# Patient Record
Sex: MALE | Race: WHITE | NOT HISPANIC OR LATINO | Employment: OTHER | URBAN - METROPOLITAN AREA
[De-identification: names, ages, dates, MRNs, and addresses within clinical notes are randomized per-mention and may not be internally consistent; named-entity substitution may affect disease eponyms.]

---

## 2019-04-18 ENCOUNTER — HOSPITAL ENCOUNTER (INPATIENT)
Facility: HOSPITAL | Age: 69
LOS: 8 days | Discharge: NURSING, REHAB OR LONG-TERM CARE | End: 2019-04-26
Attending: EMERGENCY MEDICINE | Admitting: HOSPITALIST

## 2019-04-18 ENCOUNTER — APPOINTMENT (OUTPATIENT)
Dept: CT IMAGING | Facility: HOSPITAL | Age: 69
End: 2019-04-18

## 2019-04-18 DIAGNOSIS — N17.9 AKI (ACUTE KIDNEY INJURY) (HCC): Primary | ICD-10-CM

## 2019-04-18 DIAGNOSIS — R26.89 DECREASED MOBILITY: ICD-10-CM

## 2019-04-18 PROBLEM — I69.320 COMBINED RECEPTIVE AND EXPRESSIVE APHASIA DUE TO OLD STROKE: Status: ACTIVE | Noted: 2019-04-18

## 2019-04-18 PROBLEM — I10 HYPERTENSION: Status: ACTIVE | Noted: 2019-04-18

## 2019-04-18 PROBLEM — I63.9 STROKE (HCC): Status: ACTIVE | Noted: 2019-04-18

## 2019-04-18 PROBLEM — I50.22 CHRONIC SYSTOLIC CHF (CONGESTIVE HEART FAILURE): Status: ACTIVE | Noted: 2019-04-18

## 2019-04-18 PROBLEM — E11.29 TYPE 2 DIABETES MELLITUS WITH RENAL COMPLICATION: Status: ACTIVE | Noted: 2019-04-18

## 2019-04-18 PROBLEM — G81.90 HEMIPARESIS: Status: ACTIVE | Noted: 2019-04-18

## 2019-04-18 PROBLEM — I48.91 ATRIAL FIBRILLATION: Status: ACTIVE | Noted: 2019-04-18

## 2019-04-18 LAB
ALBUMIN SERPL-MCNC: 3.9 G/DL (ref 3.5–5.2)
ALBUMIN/GLOB SERPL: 1 G/DL
ALP SERPL-CCNC: 51 U/L (ref 39–117)
ALT SERPL W P-5'-P-CCNC: 30 U/L (ref 1–41)
ANION GAP SERPL CALCULATED.3IONS-SCNC: 12.3 MMOL/L
AST SERPL-CCNC: 20 U/L (ref 1–40)
BASOPHILS # BLD AUTO: 0.05 10*3/MM3 (ref 0–0.2)
BASOPHILS NFR BLD AUTO: 0.7 % (ref 0–1.5)
BILIRUB SERPL-MCNC: 1.2 MG/DL (ref 0.2–1.2)
BUN BLD-MCNC: 67 MG/DL (ref 8–23)
BUN/CREAT SERPL: 25.6 (ref 7–25)
CALCIUM SPEC-SCNC: 10.4 MG/DL (ref 8.6–10.5)
CHLORIDE SERPL-SCNC: 101 MMOL/L (ref 98–107)
CO2 SERPL-SCNC: 21.7 MMOL/L (ref 22–29)
CREAT BLD-MCNC: 2.62 MG/DL (ref 0.76–1.27)
DEPRECATED RDW RBC AUTO: 45.4 FL (ref 37–54)
DIGOXIN SERPL-MCNC: 1.3 NG/ML (ref 0.6–1.2)
EOSINOPHIL # BLD AUTO: 0.24 10*3/MM3 (ref 0–0.4)
EOSINOPHIL NFR BLD AUTO: 3.3 % (ref 0.3–6.2)
ERYTHROCYTE [DISTWIDTH] IN BLOOD BY AUTOMATED COUNT: 13.3 % (ref 12.3–15.4)
GFR SERPL CREATININE-BSD FRML MDRD: 25 ML/MIN/1.73
GLOBULIN UR ELPH-MCNC: 4 GM/DL
GLUCOSE BLD-MCNC: 159 MG/DL (ref 65–99)
GLUCOSE BLDC GLUCOMTR-MCNC: 107 MG/DL (ref 70–130)
GLUCOSE BLDC GLUCOMTR-MCNC: 124 MG/DL (ref 70–130)
HBA1C MFR BLD: 8.7 % (ref 4.8–5.6)
HCT VFR BLD AUTO: 39.2 % (ref 37.5–51)
HGB BLD-MCNC: 12.2 G/DL (ref 13–17.7)
IMM GRANULOCYTES # BLD AUTO: 0.05 10*3/MM3 (ref 0–0.05)
IMM GRANULOCYTES NFR BLD AUTO: 0.7 % (ref 0–0.5)
INR PPP: 1.23 (ref 0.9–1.1)
LYMPHOCYTES # BLD AUTO: 0.65 10*3/MM3 (ref 0.7–3.1)
LYMPHOCYTES NFR BLD AUTO: 8.9 % (ref 19.6–45.3)
MAGNESIUM SERPL-MCNC: 1.9 MG/DL (ref 1.6–2.4)
MCH RBC QN AUTO: 29.2 PG (ref 26.6–33)
MCHC RBC AUTO-ENTMCNC: 31.1 G/DL (ref 31.5–35.7)
MCV RBC AUTO: 93.8 FL (ref 79–97)
MONOCYTES # BLD AUTO: 0.54 10*3/MM3 (ref 0.1–0.9)
MONOCYTES NFR BLD AUTO: 7.4 % (ref 5–12)
NEUTROPHILS # BLD AUTO: 5.8 10*3/MM3 (ref 1.4–7)
NEUTROPHILS NFR BLD AUTO: 79 % (ref 42.7–76)
NRBC BLD AUTO-RTO: 0 /100 WBC (ref 0–0)
PHOSPHATE SERPL-MCNC: 4.1 MG/DL (ref 2.5–4.5)
PLATELET # BLD AUTO: 134 10*3/MM3 (ref 140–450)
PMV BLD AUTO: 12.6 FL (ref 6–12)
POTASSIUM BLD-SCNC: 5.3 MMOL/L (ref 3.5–5.2)
PROT SERPL-MCNC: 7.9 G/DL (ref 6–8.5)
PROTHROMBIN TIME: 15.2 SECONDS (ref 11.7–14.2)
RBC # BLD AUTO: 4.18 10*6/MM3 (ref 4.14–5.8)
SODIUM BLD-SCNC: 135 MMOL/L (ref 136–145)
WBC NRBC COR # BLD: 7.33 10*3/MM3 (ref 3.4–10.8)

## 2019-04-18 PROCEDURE — 93010 ELECTROCARDIOGRAM REPORT: CPT | Performed by: INTERNAL MEDICINE

## 2019-04-18 PROCEDURE — 99284 EMERGENCY DEPT VISIT MOD MDM: CPT

## 2019-04-18 PROCEDURE — 85025 COMPLETE CBC W/AUTO DIFF WBC: CPT | Performed by: EMERGENCY MEDICINE

## 2019-04-18 PROCEDURE — 36415 COLL VENOUS BLD VENIPUNCTURE: CPT | Performed by: NURSE PRACTITIONER

## 2019-04-18 PROCEDURE — 83735 ASSAY OF MAGNESIUM: CPT | Performed by: NURSE PRACTITIONER

## 2019-04-18 PROCEDURE — 85610 PROTHROMBIN TIME: CPT | Performed by: NURSE PRACTITIONER

## 2019-04-18 PROCEDURE — 80053 COMPREHEN METABOLIC PANEL: CPT | Performed by: EMERGENCY MEDICINE

## 2019-04-18 PROCEDURE — 70450 CT HEAD/BRAIN W/O DYE: CPT

## 2019-04-18 PROCEDURE — 82962 GLUCOSE BLOOD TEST: CPT

## 2019-04-18 PROCEDURE — 84100 ASSAY OF PHOSPHORUS: CPT | Performed by: NURSE PRACTITIONER

## 2019-04-18 PROCEDURE — 51798 US URINE CAPACITY MEASURE: CPT

## 2019-04-18 PROCEDURE — 83036 HEMOGLOBIN GLYCOSYLATED A1C: CPT | Performed by: NURSE PRACTITIONER

## 2019-04-18 PROCEDURE — 93005 ELECTROCARDIOGRAM TRACING: CPT | Performed by: NURSE PRACTITIONER

## 2019-04-18 PROCEDURE — 25010000002 ENOXAPARIN PER 10 MG: Performed by: NURSE PRACTITIONER

## 2019-04-18 PROCEDURE — 80162 ASSAY OF DIGOXIN TOTAL: CPT | Performed by: NURSE PRACTITIONER

## 2019-04-18 RX ORDER — CARVEDILOL 12.5 MG/1
12.5 TABLET ORAL 2 TIMES DAILY WITH MEALS
Status: DISCONTINUED | OUTPATIENT
Start: 2019-04-18 | End: 2019-04-20

## 2019-04-18 RX ORDER — ATORVASTATIN CALCIUM 80 MG/1
80 TABLET, FILM COATED ORAL DAILY
Status: DISCONTINUED | OUTPATIENT
Start: 2019-04-18 | End: 2019-04-18

## 2019-04-18 RX ORDER — LOSARTAN POTASSIUM 25 MG/1
25 TABLET ORAL DAILY
COMMUNITY
End: 2019-04-26 | Stop reason: HOSPADM

## 2019-04-18 RX ORDER — ONDANSETRON 4 MG/1
4 TABLET, FILM COATED ORAL EVERY 6 HOURS PRN
Status: DISCONTINUED | OUTPATIENT
Start: 2019-04-18 | End: 2019-04-26 | Stop reason: HOSPADM

## 2019-04-18 RX ORDER — NICOTINE POLACRILEX 4 MG
15 LOZENGE BUCCAL
Status: DISCONTINUED | OUTPATIENT
Start: 2019-04-18 | End: 2019-04-26 | Stop reason: HOSPADM

## 2019-04-18 RX ORDER — SODIUM CHLORIDE 9 MG/ML
50 INJECTION, SOLUTION INTRAVENOUS CONTINUOUS
Status: DISCONTINUED | OUTPATIENT
Start: 2019-04-18 | End: 2019-04-20

## 2019-04-18 RX ORDER — CLOPIDOGREL BISULFATE 75 MG/1
75 TABLET ORAL DAILY
Status: DISCONTINUED | OUTPATIENT
Start: 2019-04-18 | End: 2019-04-21

## 2019-04-18 RX ORDER — DIGOXIN 125 MCG
125 TABLET ORAL
COMMUNITY
End: 2019-04-26 | Stop reason: HOSPADM

## 2019-04-18 RX ORDER — NICOTINE POLACRILEX 4 MG
15 LOZENGE BUCCAL
COMMUNITY

## 2019-04-18 RX ORDER — ATORVASTATIN CALCIUM 80 MG/1
80 TABLET, FILM COATED ORAL DAILY
COMMUNITY
End: 2019-04-26 | Stop reason: HOSPADM

## 2019-04-18 RX ORDER — ACETAMINOPHEN 325 MG/1
650 TABLET ORAL EVERY 4 HOURS PRN
COMMUNITY

## 2019-04-18 RX ORDER — SODIUM CHLORIDE 0.9 % (FLUSH) 0.9 %
3-10 SYRINGE (ML) INJECTION AS NEEDED
Status: DISCONTINUED | OUTPATIENT
Start: 2019-04-18 | End: 2019-04-26 | Stop reason: HOSPADM

## 2019-04-18 RX ORDER — CLOPIDOGREL BISULFATE 75 MG/1
75 TABLET ORAL DAILY
COMMUNITY
End: 2019-04-26 | Stop reason: HOSPADM

## 2019-04-18 RX ORDER — POLYETHYLENE GLYCOL 3350 17 G/17G
17 POWDER, FOR SOLUTION ORAL DAILY PRN
COMMUNITY

## 2019-04-18 RX ORDER — SODIUM CHLORIDE 0.9 % (FLUSH) 0.9 %
10 SYRINGE (ML) INJECTION AS NEEDED
Status: DISCONTINUED | OUTPATIENT
Start: 2019-04-18 | End: 2019-04-26 | Stop reason: HOSPADM

## 2019-04-18 RX ORDER — CARVEDILOL 12.5 MG/1
12.5 TABLET ORAL 2 TIMES DAILY WITH MEALS
COMMUNITY
End: 2019-04-26 | Stop reason: HOSPADM

## 2019-04-18 RX ORDER — DEXTROSE MONOHYDRATE 25 G/50ML
25 INJECTION, SOLUTION INTRAVENOUS
Status: DISCONTINUED | OUTPATIENT
Start: 2019-04-18 | End: 2019-04-26 | Stop reason: HOSPADM

## 2019-04-18 RX ORDER — SODIUM CHLORIDE 0.9 % (FLUSH) 0.9 %
3 SYRINGE (ML) INJECTION EVERY 12 HOURS SCHEDULED
Status: DISCONTINUED | OUTPATIENT
Start: 2019-04-18 | End: 2019-04-26 | Stop reason: HOSPADM

## 2019-04-18 RX ORDER — ATORVASTATIN CALCIUM 80 MG/1
80 TABLET, FILM COATED ORAL NIGHTLY
Status: DISCONTINUED | OUTPATIENT
Start: 2019-04-18 | End: 2019-04-20

## 2019-04-18 RX ORDER — ALLOPURINOL 300 MG/1
300 TABLET ORAL DAILY
COMMUNITY

## 2019-04-18 RX ADMIN — CLOPIDOGREL 75 MG: 75 TABLET, FILM COATED ORAL at 17:45

## 2019-04-18 RX ADMIN — CARVEDILOL 12.5 MG: 12.5 TABLET, FILM COATED ORAL at 17:45

## 2019-04-18 RX ADMIN — ENOXAPARIN SODIUM 30 MG: 30 INJECTION SUBCUTANEOUS at 17:45

## 2019-04-18 RX ADMIN — SODIUM CHLORIDE 100 ML/HR: 9 INJECTION, SOLUTION INTRAVENOUS at 17:46

## 2019-04-18 RX ADMIN — SODIUM CHLORIDE 1000 ML: 9 INJECTION, SOLUTION INTRAVENOUS at 12:50

## 2019-04-19 LAB
ANION GAP SERPL CALCULATED.3IONS-SCNC: 13.7 MMOL/L
BACTERIA UR QL AUTO: NORMAL /HPF
BILIRUB UR QL STRIP: NEGATIVE
BUN BLD-MCNC: 55 MG/DL (ref 8–23)
BUN/CREAT SERPL: 28.5 (ref 7–25)
CALCIUM SPEC-SCNC: 8.8 MG/DL (ref 8.6–10.5)
CHLORIDE SERPL-SCNC: 106 MMOL/L (ref 98–107)
CLARITY UR: CLEAR
CO2 SERPL-SCNC: 19.3 MMOL/L (ref 22–29)
COLOR UR: YELLOW
CORTIS SERPL-MCNC: 9.62 MCG/DL
CREAT BLD-MCNC: 1.93 MG/DL (ref 0.76–1.27)
GFR SERPL CREATININE-BSD FRML MDRD: 35 ML/MIN/1.73
GLUCOSE BLD-MCNC: 109 MG/DL (ref 65–99)
GLUCOSE BLDC GLUCOMTR-MCNC: 113 MG/DL (ref 70–130)
GLUCOSE BLDC GLUCOMTR-MCNC: 134 MG/DL (ref 70–130)
GLUCOSE BLDC GLUCOMTR-MCNC: 137 MG/DL (ref 70–130)
GLUCOSE BLDC GLUCOMTR-MCNC: 138 MG/DL (ref 70–130)
GLUCOSE UR STRIP-MCNC: NEGATIVE MG/DL
HGB UR QL STRIP.AUTO: NEGATIVE
HYALINE CASTS UR QL AUTO: NORMAL /LPF
KETONES UR QL STRIP: ABNORMAL
LEUKOCYTE ESTERASE UR QL STRIP.AUTO: NEGATIVE
NITRITE UR QL STRIP: NEGATIVE
PH UR STRIP.AUTO: <=5 [PH] (ref 5–8)
POTASSIUM BLD-SCNC: 4.4 MMOL/L (ref 3.5–5.2)
PROT UR QL STRIP: ABNORMAL
RBC # UR: NORMAL /HPF
REF LAB TEST METHOD: NORMAL
SODIUM BLD-SCNC: 139 MMOL/L (ref 136–145)
SP GR UR STRIP: 1.02 (ref 1–1.03)
SQUAMOUS #/AREA URNS HPF: NORMAL /HPF
UROBILINOGEN UR QL STRIP: ABNORMAL
WBC UR QL AUTO: NORMAL /HPF

## 2019-04-19 PROCEDURE — 92610 EVALUATE SWALLOWING FUNCTION: CPT

## 2019-04-19 PROCEDURE — 81001 URINALYSIS AUTO W/SCOPE: CPT | Performed by: NURSE PRACTITIONER

## 2019-04-19 PROCEDURE — 97110 THERAPEUTIC EXERCISES: CPT

## 2019-04-19 PROCEDURE — 82962 GLUCOSE BLOOD TEST: CPT

## 2019-04-19 PROCEDURE — 82533 TOTAL CORTISOL: CPT | Performed by: HOSPITALIST

## 2019-04-19 PROCEDURE — 25010000002 ENOXAPARIN PER 10 MG: Performed by: NURSE PRACTITIONER

## 2019-04-19 PROCEDURE — 97166 OT EVAL MOD COMPLEX 45 MIN: CPT

## 2019-04-19 PROCEDURE — 80048 BASIC METABOLIC PNL TOTAL CA: CPT | Performed by: NURSE PRACTITIONER

## 2019-04-19 PROCEDURE — 99222 1ST HOSP IP/OBS MODERATE 55: CPT | Performed by: INTERNAL MEDICINE

## 2019-04-19 PROCEDURE — 97162 PT EVAL MOD COMPLEX 30 MIN: CPT

## 2019-04-19 RX ADMIN — SODIUM CHLORIDE 100 ML/HR: 9 INJECTION, SOLUTION INTRAVENOUS at 04:14

## 2019-04-19 RX ADMIN — CARVEDILOL 12.5 MG: 12.5 TABLET, FILM COATED ORAL at 09:43

## 2019-04-19 RX ADMIN — ENOXAPARIN SODIUM 30 MG: 30 INJECTION SUBCUTANEOUS at 13:55

## 2019-04-19 RX ADMIN — ATORVASTATIN CALCIUM 80 MG: 80 TABLET, FILM COATED ORAL at 22:00

## 2019-04-19 RX ADMIN — SODIUM CHLORIDE 100 ML/HR: 9 INJECTION, SOLUTION INTRAVENOUS at 13:55

## 2019-04-19 RX ADMIN — SODIUM CHLORIDE, PRESERVATIVE FREE 3 ML: 5 INJECTION INTRAVENOUS at 09:39

## 2019-04-19 RX ADMIN — CARVEDILOL 12.5 MG: 12.5 TABLET, FILM COATED ORAL at 17:22

## 2019-04-19 RX ADMIN — CLOPIDOGREL 75 MG: 75 TABLET, FILM COATED ORAL at 09:44

## 2019-04-20 LAB
ALBUMIN SERPL-MCNC: 3.5 G/DL (ref 3.5–5.2)
ANION GAP SERPL CALCULATED.3IONS-SCNC: 14.1 MMOL/L
BASOPHILS # BLD AUTO: 0.03 10*3/MM3 (ref 0–0.2)
BASOPHILS NFR BLD AUTO: 0.6 % (ref 0–1.5)
BUN BLD-MCNC: 37 MG/DL (ref 8–23)
BUN/CREAT SERPL: 22.7 (ref 7–25)
CALCIUM SPEC-SCNC: 8.8 MG/DL (ref 8.6–10.5)
CHLORIDE SERPL-SCNC: 106 MMOL/L (ref 98–107)
CHLORIDE UR-SCNC: 68 MMOL/L
CO2 SERPL-SCNC: 18.9 MMOL/L (ref 22–29)
CREAT BLD-MCNC: 1.63 MG/DL (ref 0.76–1.27)
CREAT UR-MCNC: 133.5 MG/DL
DEPRECATED RDW RBC AUTO: 43.3 FL (ref 37–54)
EOSINOPHIL # BLD AUTO: 0.23 10*3/MM3 (ref 0–0.4)
EOSINOPHIL NFR BLD AUTO: 4.7 % (ref 0.3–6.2)
ERYTHROCYTE [DISTWIDTH] IN BLOOD BY AUTOMATED COUNT: 13.5 % (ref 12.3–15.4)
GFR SERPL CREATININE-BSD FRML MDRD: 42 ML/MIN/1.73
GLUCOSE BLD-MCNC: 121 MG/DL (ref 65–99)
GLUCOSE BLDC GLUCOMTR-MCNC: 110 MG/DL (ref 70–130)
GLUCOSE BLDC GLUCOMTR-MCNC: 112 MG/DL (ref 70–130)
GLUCOSE BLDC GLUCOMTR-MCNC: 126 MG/DL (ref 70–130)
GLUCOSE BLDC GLUCOMTR-MCNC: 138 MG/DL (ref 70–130)
GLUCOSE BLDC GLUCOMTR-MCNC: 140 MG/DL (ref 70–130)
HCT VFR BLD AUTO: 30.9 % (ref 37.5–51)
HGB BLD-MCNC: 10 G/DL (ref 13–17.7)
LYMPHOCYTES # BLD AUTO: 0.67 10*3/MM3 (ref 0.7–3.1)
LYMPHOCYTES NFR BLD AUTO: 13.7 % (ref 19.6–45.3)
MAGNESIUM SERPL-MCNC: 1.7 MG/DL (ref 1.6–2.4)
MCH RBC QN AUTO: 28.9 PG (ref 26.6–33)
MCHC RBC AUTO-ENTMCNC: 32.4 G/DL (ref 31.5–35.7)
MCV RBC AUTO: 89.3 FL (ref 79–97)
MONOCYTES # BLD AUTO: 0.54 10*3/MM3 (ref 0.1–0.9)
MONOCYTES NFR BLD AUTO: 11.1 % (ref 5–12)
NEUTROPHILS # BLD AUTO: 3.39 10*3/MM3 (ref 1.7–7)
NEUTROPHILS NFR BLD AUTO: 69.5 % (ref 42.7–76)
PHOSPHATE SERPL-MCNC: 2.6 MG/DL (ref 2.5–4.5)
PLATELET # BLD AUTO: 96 10*3/MM3 (ref 140–450)
PMV BLD AUTO: 13.3 FL (ref 6–12)
POTASSIUM BLD-SCNC: 4.3 MMOL/L (ref 3.5–5.2)
PROT UR-MCNC: 73 MG/DL
PROT/CREAT UR: 546.8 MG/G CREA (ref 0–200)
RBC # BLD AUTO: 3.46 10*6/MM3 (ref 4.14–5.8)
SODIUM BLD-SCNC: 139 MMOL/L (ref 136–145)
SODIUM UR-SCNC: 65 MMOL/L
URATE SERPL-MCNC: 4.1 MG/DL (ref 3.4–7)
WBC NRBC COR # BLD: 4.88 10*3/MM3 (ref 3.4–10.8)

## 2019-04-20 PROCEDURE — 84300 ASSAY OF URINE SODIUM: CPT | Performed by: INTERNAL MEDICINE

## 2019-04-20 PROCEDURE — 82436 ASSAY OF URINE CHLORIDE: CPT | Performed by: INTERNAL MEDICINE

## 2019-04-20 PROCEDURE — 84156 ASSAY OF PROTEIN URINE: CPT | Performed by: INTERNAL MEDICINE

## 2019-04-20 PROCEDURE — 83735 ASSAY OF MAGNESIUM: CPT | Performed by: INTERNAL MEDICINE

## 2019-04-20 PROCEDURE — 84550 ASSAY OF BLOOD/URIC ACID: CPT | Performed by: INTERNAL MEDICINE

## 2019-04-20 PROCEDURE — 97530 THERAPEUTIC ACTIVITIES: CPT

## 2019-04-20 PROCEDURE — 25010000002 ENOXAPARIN PER 10 MG: Performed by: NURSE PRACTITIONER

## 2019-04-20 PROCEDURE — 99223 1ST HOSP IP/OBS HIGH 75: CPT | Performed by: PSYCHIATRY & NEUROLOGY

## 2019-04-20 PROCEDURE — 99233 SBSQ HOSP IP/OBS HIGH 50: CPT | Performed by: INTERNAL MEDICINE

## 2019-04-20 PROCEDURE — 80069 RENAL FUNCTION PANEL: CPT | Performed by: INTERNAL MEDICINE

## 2019-04-20 PROCEDURE — 82570 ASSAY OF URINE CREATININE: CPT | Performed by: INTERNAL MEDICINE

## 2019-04-20 PROCEDURE — 82962 GLUCOSE BLOOD TEST: CPT

## 2019-04-20 PROCEDURE — 85025 COMPLETE CBC W/AUTO DIFF WBC: CPT | Performed by: INTERNAL MEDICINE

## 2019-04-20 RX ORDER — CARVEDILOL 25 MG/1
25 TABLET ORAL 2 TIMES DAILY WITH MEALS
Status: DISCONTINUED | OUTPATIENT
Start: 2019-04-20 | End: 2019-04-26 | Stop reason: HOSPADM

## 2019-04-20 RX ORDER — SODIUM CHLORIDE 0.9 % (FLUSH) 0.9 %
3-10 SYRINGE (ML) INJECTION AS NEEDED
Status: DISCONTINUED | OUTPATIENT
Start: 2019-04-20 | End: 2019-04-26 | Stop reason: HOSPADM

## 2019-04-20 RX ORDER — ATORVASTATIN CALCIUM 80 MG/1
80 TABLET, FILM COATED ORAL NIGHTLY
Status: DISCONTINUED | OUTPATIENT
Start: 2019-04-20 | End: 2019-04-21

## 2019-04-20 RX ORDER — SODIUM CHLORIDE 0.9 % (FLUSH) 0.9 %
3 SYRINGE (ML) INJECTION EVERY 12 HOURS SCHEDULED
Status: DISCONTINUED | OUTPATIENT
Start: 2019-04-20 | End: 2019-04-26 | Stop reason: HOSPADM

## 2019-04-20 RX ADMIN — ENOXAPARIN SODIUM 30 MG: 30 INJECTION SUBCUTANEOUS at 15:19

## 2019-04-20 RX ADMIN — CARVEDILOL 12.5 MG: 12.5 TABLET, FILM COATED ORAL at 09:08

## 2019-04-20 RX ADMIN — CLOPIDOGREL 75 MG: 75 TABLET, FILM COATED ORAL at 09:11

## 2019-04-20 RX ADMIN — SODIUM CHLORIDE, PRESERVATIVE FREE 3 ML: 5 INJECTION INTRAVENOUS at 21:34

## 2019-04-20 RX ADMIN — SODIUM CHLORIDE, PRESERVATIVE FREE 3 ML: 5 INJECTION INTRAVENOUS at 09:12

## 2019-04-21 ENCOUNTER — APPOINTMENT (OUTPATIENT)
Dept: CARDIOLOGY | Facility: HOSPITAL | Age: 69
End: 2019-04-21

## 2019-04-21 ENCOUNTER — APPOINTMENT (OUTPATIENT)
Dept: MRI IMAGING | Facility: HOSPITAL | Age: 69
End: 2019-04-21

## 2019-04-21 LAB
ANION GAP SERPL CALCULATED.3IONS-SCNC: 12 MMOL/L
AORTIC DIMENSIONLESS INDEX: 0.5 (DI)
APTT PPP: 32.5 SECONDS (ref 22.7–35.4)
BASOPHILS # BLD AUTO: 0.03 10*3/MM3 (ref 0–0.2)
BASOPHILS NFR BLD AUTO: 0.7 % (ref 0–1.5)
BH CV ECHO MEAS - ACS: 1.8 CM
BH CV ECHO MEAS - AI DEC SLOPE: 254.4 CM/SEC^2
BH CV ECHO MEAS - AI MAX PG: 111 MMHG
BH CV ECHO MEAS - AI MAX VEL: 526.4 CM/SEC
BH CV ECHO MEAS - AI P1/2T: 606 MSEC
BH CV ECHO MEAS - AO MAX PG: 15 MMHG
BH CV ECHO MEAS - AO MEAN PG (FULL): 5 MMHG
BH CV ECHO MEAS - AO MEAN PG: 7 MMHG
BH CV ECHO MEAS - AO ROOT AREA (BSA CORRECTED): 1.4
BH CV ECHO MEAS - AO ROOT AREA: 8.6 CM^2
BH CV ECHO MEAS - AO ROOT DIAM: 3.3 CM
BH CV ECHO MEAS - AO V2 MAX: 190 CM/SEC
BH CV ECHO MEAS - AO V2 MEAN: 127 CM/SEC
BH CV ECHO MEAS - AO V2 VTI: 27.8 CM
BH CV ECHO MEAS - AVA(I,A): 2 CM^2
BH CV ECHO MEAS - AVA(I,D): 2 CM^2
BH CV ECHO MEAS - BSA(HAYCOCK): 2.5 M^2
BH CV ECHO MEAS - BSA: 2.4 M^2
BH CV ECHO MEAS - BZI_BMI: 32.6 KILOGRAMS/M^2
BH CV ECHO MEAS - BZI_METRIC_HEIGHT: 188 CM
BH CV ECHO MEAS - BZI_METRIC_WEIGHT: 115.2 KG
BH CV ECHO MEAS - EDV(CUBED): 140.6 ML
BH CV ECHO MEAS - EDV(MOD-SP2): 159 ML
BH CV ECHO MEAS - EDV(MOD-SP4): 164 ML
BH CV ECHO MEAS - EDV(TEICH): 129.5 ML
BH CV ECHO MEAS - EF(CUBED): 74.4 %
BH CV ECHO MEAS - EF(MOD-BP): 51 %
BH CV ECHO MEAS - EF(MOD-SP2): 52.8 %
BH CV ECHO MEAS - EF(MOD-SP4): 48.2 %
BH CV ECHO MEAS - EF(TEICH): 65.9 %
BH CV ECHO MEAS - ESV(CUBED): 35.9 ML
BH CV ECHO MEAS - ESV(MOD-SP2): 75 ML
BH CV ECHO MEAS - ESV(MOD-SP4): 85 ML
BH CV ECHO MEAS - ESV(TEICH): 44.1 ML
BH CV ECHO MEAS - FS: 36.5 %
BH CV ECHO MEAS - IVS/LVPW: 1
BH CV ECHO MEAS - IVSD: 1.7 CM
BH CV ECHO MEAS - LAT PEAK E' VEL: 12 CM/SEC
BH CV ECHO MEAS - LV DIASTOLIC VOL/BSA (35-75): 68.2 ML/M^2
BH CV ECHO MEAS - LV MASS(C)D: 412.8 GRAMS
BH CV ECHO MEAS - LV MASS(C)DI: 171.6 GRAMS/M^2
BH CV ECHO MEAS - LV MEAN PG: 2 MMHG
BH CV ECHO MEAS - LV SYSTOLIC VOL/BSA (12-30): 35.3 ML/M^2
BH CV ECHO MEAS - LV V1 MAX: 87 CM/SEC
BH CV ECHO MEAS - LV V1 MEAN: 64.8 CM/SEC
BH CV ECHO MEAS - LV V1 VTI: 14.6 CM
BH CV ECHO MEAS - LVIDD: 5.2 CM
BH CV ECHO MEAS - LVIDS: 3.3 CM
BH CV ECHO MEAS - LVLD AP2: 9.1 CM
BH CV ECHO MEAS - LVLD AP4: 8.5 CM
BH CV ECHO MEAS - LVLS AP2: 7.9 CM
BH CV ECHO MEAS - LVLS AP4: 7.7 CM
BH CV ECHO MEAS - LVOT AREA (M): 3.8 CM^2
BH CV ECHO MEAS - LVOT AREA: 3.8 CM^2
BH CV ECHO MEAS - LVOT DIAM: 2.2 CM
BH CV ECHO MEAS - LVPWD: 1.7 CM
BH CV ECHO MEAS - MED PEAK E' VEL: 6 CM/SEC
BH CV ECHO MEAS - MR MAX PG: 114.4 MMHG
BH CV ECHO MEAS - MR MAX VEL: 534.5 CM/SEC
BH CV ECHO MEAS - MV DEC SLOPE: 372 CM/SEC^2
BH CV ECHO MEAS - MV DEC TIME: 0.18 SEC
BH CV ECHO MEAS - MV E MAX VEL: 72.1 CM/SEC
BH CV ECHO MEAS - MV MEAN PG: 1 MMHG
BH CV ECHO MEAS - MV P1/2T MAX VEL: 92.3 CM/SEC
BH CV ECHO MEAS - MV P1/2T: 72.7 MSEC
BH CV ECHO MEAS - MV V2 MEAN: 56.1 CM/SEC
BH CV ECHO MEAS - MV V2 VTI: 16.3 CM
BH CV ECHO MEAS - MVA P1/2T LCG: 2.4 CM^2
BH CV ECHO MEAS - MVA(P1/2T): 3 CM^2
BH CV ECHO MEAS - MVA(VTI): 3.4 CM^2
BH CV ECHO MEAS - PA ACC SLOPE: 15.2 CM/SEC^2
BH CV ECHO MEAS - PA ACC TIME: 0.1 SEC
BH CV ECHO MEAS - PA MAX PG: 3.1 MMHG
BH CV ECHO MEAS - PA PR(ACCEL): 33.1 MMHG
BH CV ECHO MEAS - PA V2 MAX: 88.4 CM/SEC
BH CV ECHO MEAS - QP/QS: 0.64
BH CV ECHO MEAS - RV MEAN PG: 1 MMHG
BH CV ECHO MEAS - RV V1 MEAN: 39.1 CM/SEC
BH CV ECHO MEAS - RV V1 VTI: 10.3 CM
BH CV ECHO MEAS - RVOT AREA: 3.5 CM^2
BH CV ECHO MEAS - RVOT DIAM: 2.1 CM
BH CV ECHO MEAS - SI(AO): 98.8 ML/M^2
BH CV ECHO MEAS - SI(CUBED): 43.5 ML/M^2
BH CV ECHO MEAS - SI(LVOT): 23.1 ML/M^2
BH CV ECHO MEAS - SI(MOD-SP2): 34.9 ML/M^2
BH CV ECHO MEAS - SI(MOD-SP4): 32.8 ML/M^2
BH CV ECHO MEAS - SI(TEICH): 35.5 ML/M^2
BH CV ECHO MEAS - SV(AO): 237.8 ML
BH CV ECHO MEAS - SV(CUBED): 104.7 ML
BH CV ECHO MEAS - SV(LVOT): 55.5 ML
BH CV ECHO MEAS - SV(MOD-SP2): 84 ML
BH CV ECHO MEAS - SV(MOD-SP4): 79 ML
BH CV ECHO MEAS - SV(RVOT): 35.7 ML
BH CV ECHO MEAS - SV(TEICH): 85.4 ML
BH CV ECHO MEAS - TAPSE (>1.6): 1.5 CM2
BH CV ECHO MEAS - TR MAX PG: 49
BH CV ECHO MEAS - TR MAX VEL: 351 CM/SEC
BH CV ECHO MEASUREMENTS AVERAGE E/E' RATIO: 8.01
BH CV VAS BP RIGHT ARM: NORMAL MMHG
BH CV XLRA - RV BASE: 3.8 CM
BH CV XLRA - TDI S': 14 CM/SEC
BH CV XLRA MEAS LEFT DIST CCA EDV: 13.5 CM/SEC
BH CV XLRA MEAS LEFT DIST CCA PSV: 56.9 CM/SEC
BH CV XLRA MEAS LEFT DIST ICA EDV: -14.1 CM/SEC
BH CV XLRA MEAS LEFT DIST ICA PSV: -41.3 CM/SEC
BH CV XLRA MEAS LEFT ICA/CCA RATIO: 0.9
BH CV XLRA MEAS LEFT MID ICA EDV: -16.5 CM/SEC
BH CV XLRA MEAS LEFT MID ICA PSV: -52.2 CM/SEC
BH CV XLRA MEAS LEFT PROX CCA EDV: 12.3 CM/SEC
BH CV XLRA MEAS LEFT PROX CCA PSV: 74.5 CM/SEC
BH CV XLRA MEAS LEFT PROX ECA EDV: -11.7 CM/SEC
BH CV XLRA MEAS LEFT PROX ECA PSV: -73.9 CM/SEC
BH CV XLRA MEAS LEFT PROX ICA EDV: -21.6 CM/SEC
BH CV XLRA MEAS LEFT PROX ICA PSV: -54.2 CM/SEC
BH CV XLRA MEAS LEFT PROX SCLA PSV: 66.8 CM/SEC
BH CV XLRA MEAS LEFT VERTEBRAL A EDV: -13.7 CM/SEC
BH CV XLRA MEAS LEFT VERTEBRAL A PSV: -38.9 CM/SEC
BH CV XLRA MEAS RIGHT DIST CCA EDV: -12.2 CM/SEC
BH CV XLRA MEAS RIGHT DIST CCA PSV: -63.3 CM/SEC
BH CV XLRA MEAS RIGHT DIST ICA EDV: -14.5 CM/SEC
BH CV XLRA MEAS RIGHT DIST ICA PSV: -40.5 CM/SEC
BH CV XLRA MEAS RIGHT ICA/CCA RATIO: 1.1
BH CV XLRA MEAS RIGHT MID ICA EDV: -20.4 CM/SEC
BH CV XLRA MEAS RIGHT MID ICA PSV: -61.3 CM/SEC
BH CV XLRA MEAS RIGHT PROX CCA EDV: 7.5 CM/SEC
BH CV XLRA MEAS RIGHT PROX CCA PSV: 66.4 CM/SEC
BH CV XLRA MEAS RIGHT PROX ECA EDV: -16.5 CM/SEC
BH CV XLRA MEAS RIGHT PROX ECA PSV: -62.9 CM/SEC
BH CV XLRA MEAS RIGHT PROX ICA EDV: -35 CM/SEC
BH CV XLRA MEAS RIGHT PROX ICA PSV: -75.8 CM/SEC
BH CV XLRA MEAS RIGHT PROX SCLA PSV: 58 CM/SEC
BH CV XLRA MEAS RIGHT VERTEBRAL A EDV: 7.9 CM/SEC
BH CV XLRA MEAS RIGHT VERTEBRAL A PSV: 25.9 CM/SEC
BUN BLD-MCNC: 25 MG/DL (ref 8–23)
BUN/CREAT SERPL: 17.9 (ref 7–25)
CALCIUM SPEC-SCNC: 8.9 MG/DL (ref 8.6–10.5)
CHLORIDE SERPL-SCNC: 109 MMOL/L (ref 98–107)
CHOLEST SERPL-MCNC: 77 MG/DL (ref 0–200)
CO2 SERPL-SCNC: 21 MMOL/L (ref 22–29)
CREAT BLD-MCNC: 1.4 MG/DL (ref 0.76–1.27)
DEPRECATED RDW RBC AUTO: 44 FL (ref 37–54)
EOSINOPHIL # BLD AUTO: 0.22 10*3/MM3 (ref 0–0.4)
EOSINOPHIL NFR BLD AUTO: 4.9 % (ref 0.3–6.2)
ERYTHROCYTE [DISTWIDTH] IN BLOOD BY AUTOMATED COUNT: 13.7 % (ref 12.3–15.4)
GFR SERPL CREATININE-BSD FRML MDRD: 51 ML/MIN/1.73
GLUCOSE BLD-MCNC: 115 MG/DL (ref 65–99)
GLUCOSE BLDC GLUCOMTR-MCNC: 105 MG/DL (ref 70–130)
GLUCOSE BLDC GLUCOMTR-MCNC: 108 MG/DL (ref 70–130)
GLUCOSE BLDC GLUCOMTR-MCNC: 110 MG/DL (ref 70–130)
GLUCOSE BLDC GLUCOMTR-MCNC: 119 MG/DL (ref 70–130)
HCT VFR BLD AUTO: 34.1 % (ref 37.5–51)
HDLC SERPL-MCNC: 31 MG/DL (ref 40–60)
HGB BLD-MCNC: 10.8 G/DL (ref 13–17.7)
IMM GRANULOCYTES # BLD AUTO: 0.02 10*3/MM3 (ref 0–0.05)
IMM GRANULOCYTES NFR BLD AUTO: 0.4 % (ref 0–0.5)
INR PPP: 1.23 (ref 0.9–1.1)
LDLC SERPL CALC-MCNC: 30 MG/DL (ref 0–100)
LDLC/HDLC SERPL: 0.97 {RATIO}
LEFT ARM BP: NORMAL MMHG
LEFT ATRIUM VOLUME INDEX: 34 ML/M2
LV EF 2D ECHO EST: 51 %
LYMPHOCYTES # BLD AUTO: 0.64 10*3/MM3 (ref 0.7–3.1)
LYMPHOCYTES NFR BLD AUTO: 14.2 % (ref 19.6–45.3)
MAXIMAL PREDICTED HEART RATE: 153 BPM
MCH RBC QN AUTO: 28.6 PG (ref 26.6–33)
MCHC RBC AUTO-ENTMCNC: 31.7 G/DL (ref 31.5–35.7)
MCV RBC AUTO: 90.5 FL (ref 79–97)
MONOCYTES # BLD AUTO: 0.44 10*3/MM3 (ref 0.1–0.9)
MONOCYTES NFR BLD AUTO: 9.7 % (ref 5–12)
NEUTROPHILS # BLD AUTO: 3.17 10*3/MM3 (ref 1.7–7)
NEUTROPHILS NFR BLD AUTO: 70.1 % (ref 42.7–76)
NRBC BLD AUTO-RTO: 0 /100 WBC (ref 0–0.2)
PLATELET # BLD AUTO: 106 10*3/MM3 (ref 140–450)
PMV BLD AUTO: 12.8 FL (ref 6–12)
POTASSIUM BLD-SCNC: 4.4 MMOL/L (ref 3.5–5.2)
PROTHROMBIN TIME: 15.2 SECONDS (ref 11.7–14.2)
RBC # BLD AUTO: 3.77 10*6/MM3 (ref 4.14–5.8)
RIGHT ARM BP: NORMAL MMHG
SODIUM BLD-SCNC: 142 MMOL/L (ref 136–145)
STRESS TARGET HR: 130 BPM
TRIGL SERPL-MCNC: 79 MG/DL (ref 0–150)
TSH SERPL DL<=0.05 MIU/L-ACNC: 1.24 MIU/ML (ref 0.27–4.2)
VIT B12 BLD-MCNC: 835 PG/ML (ref 211–946)
VLDLC SERPL-MCNC: 15.8 MG/DL (ref 5–40)
WBC NRBC COR # BLD: 4.52 10*3/MM3 (ref 3.4–10.8)

## 2019-04-21 PROCEDURE — 25010000002 ENOXAPARIN PER 10 MG: Performed by: NURSE PRACTITIONER

## 2019-04-21 PROCEDURE — 85610 PROTHROMBIN TIME: CPT | Performed by: PSYCHIATRY & NEUROLOGY

## 2019-04-21 PROCEDURE — 82607 VITAMIN B-12: CPT | Performed by: NURSE PRACTITIONER

## 2019-04-21 PROCEDURE — 84443 ASSAY THYROID STIM HORMONE: CPT | Performed by: NURSE PRACTITIONER

## 2019-04-21 PROCEDURE — 82962 GLUCOSE BLOOD TEST: CPT

## 2019-04-21 PROCEDURE — 93306 TTE W/DOPPLER COMPLETE: CPT | Performed by: INTERNAL MEDICINE

## 2019-04-21 PROCEDURE — 93880 EXTRACRANIAL BILAT STUDY: CPT

## 2019-04-21 PROCEDURE — 25010000002 HEPARIN (PORCINE) PER 1000 UNITS: Performed by: PSYCHIATRY & NEUROLOGY

## 2019-04-21 PROCEDURE — 80048 BASIC METABOLIC PNL TOTAL CA: CPT | Performed by: NURSE PRACTITIONER

## 2019-04-21 PROCEDURE — 36415 COLL VENOUS BLD VENIPUNCTURE: CPT | Performed by: NURSE PRACTITIONER

## 2019-04-21 PROCEDURE — 25010000002 LORAZEPAM PER 2 MG: Performed by: PSYCHIATRY & NEUROLOGY

## 2019-04-21 PROCEDURE — 80061 LIPID PANEL: CPT | Performed by: PSYCHIATRY & NEUROLOGY

## 2019-04-21 PROCEDURE — 85025 COMPLETE CBC W/AUTO DIFF WBC: CPT | Performed by: PSYCHIATRY & NEUROLOGY

## 2019-04-21 PROCEDURE — 99231 SBSQ HOSP IP/OBS SF/LOW 25: CPT | Performed by: NURSE PRACTITIONER

## 2019-04-21 PROCEDURE — 70551 MRI BRAIN STEM W/O DYE: CPT

## 2019-04-21 PROCEDURE — 85730 THROMBOPLASTIN TIME PARTIAL: CPT | Performed by: PSYCHIATRY & NEUROLOGY

## 2019-04-21 PROCEDURE — 99233 SBSQ HOSP IP/OBS HIGH 50: CPT | Performed by: NURSE PRACTITIONER

## 2019-04-21 PROCEDURE — 93306 TTE W/DOPPLER COMPLETE: CPT

## 2019-04-21 RX ORDER — LORAZEPAM 2 MG/ML
1 INJECTION INTRAMUSCULAR ONCE
Status: COMPLETED | OUTPATIENT
Start: 2019-04-21 | End: 2019-04-21

## 2019-04-21 RX ORDER — HEPARIN SODIUM 10000 [USP'U]/100ML
8.7 INJECTION, SOLUTION INTRAVENOUS
Status: DISCONTINUED | OUTPATIENT
Start: 2019-04-21 | End: 2019-04-22

## 2019-04-21 RX ORDER — SODIUM CHLORIDE 9 MG/ML
75 INJECTION, SOLUTION INTRAVENOUS CONTINUOUS
Status: ACTIVE | OUTPATIENT
Start: 2019-04-21 | End: 2019-04-23

## 2019-04-21 RX ORDER — ECHINACEA PURPUREA EXTRACT 125 MG
1 TABLET ORAL AS NEEDED
Status: DISCONTINUED | OUTPATIENT
Start: 2019-04-21 | End: 2019-04-26 | Stop reason: HOSPADM

## 2019-04-21 RX ORDER — ATORVASTATIN CALCIUM 20 MG/1
40 TABLET, FILM COATED ORAL NIGHTLY
Status: DISCONTINUED | OUTPATIENT
Start: 2019-04-21 | End: 2019-04-26 | Stop reason: HOSPADM

## 2019-04-21 RX ORDER — WARFARIN SODIUM 2.5 MG/1
2.5 TABLET ORAL
Status: DISCONTINUED | OUTPATIENT
Start: 2019-04-21 | End: 2019-04-22

## 2019-04-21 RX ORDER — LORAZEPAM 2 MG/ML
1 INJECTION INTRAMUSCULAR ONCE
Status: DISCONTINUED | OUTPATIENT
Start: 2019-04-21 | End: 2019-04-26 | Stop reason: HOSPADM

## 2019-04-21 RX ORDER — WARFARIN SODIUM 2.5 MG/1
2.5 TABLET ORAL
Status: DISCONTINUED | OUTPATIENT
Start: 2019-04-21 | End: 2019-04-21

## 2019-04-21 RX ADMIN — METOPROLOL TARTRATE 5 MG: 5 INJECTION INTRAVENOUS at 23:39

## 2019-04-21 RX ADMIN — SODIUM CHLORIDE 75 ML/HR: 9 INJECTION, SOLUTION INTRAVENOUS at 13:30

## 2019-04-21 RX ADMIN — Medication 1 SPRAY: at 22:15

## 2019-04-21 RX ADMIN — ENOXAPARIN SODIUM 30 MG: 30 INJECTION SUBCUTANEOUS at 15:40

## 2019-04-21 RX ADMIN — SODIUM CHLORIDE, PRESERVATIVE FREE 3 ML: 5 INJECTION INTRAVENOUS at 08:16

## 2019-04-21 RX ADMIN — LORAZEPAM 1 MG: 2 INJECTION INTRAMUSCULAR; INTRAVENOUS at 14:59

## 2019-04-21 RX ADMIN — HEPARIN SODIUM 8.7 UNITS/KG/HR: 10000 INJECTION, SOLUTION INTRAVENOUS at 19:38

## 2019-04-21 RX ADMIN — SODIUM CHLORIDE, PRESERVATIVE FREE 3 ML: 5 INJECTION INTRAVENOUS at 22:04

## 2019-04-22 LAB
ANION GAP SERPL CALCULATED.3IONS-SCNC: 11.5 MMOL/L
APTT PPP: 53.8 SECONDS (ref 22.7–35.4)
APTT PPP: 68 SECONDS (ref 22.7–35.4)
BASOPHILS # BLD AUTO: 0.03 10*3/MM3 (ref 0–0.2)
BASOPHILS NFR BLD AUTO: 0.7 % (ref 0–1.5)
BUN BLD-MCNC: 20 MG/DL (ref 8–23)
BUN/CREAT SERPL: 16.3 (ref 7–25)
CALCIUM SPEC-SCNC: 8.6 MG/DL (ref 8.6–10.5)
CHLORIDE SERPL-SCNC: 109 MMOL/L (ref 98–107)
CO2 SERPL-SCNC: 19.5 MMOL/L (ref 22–29)
CREAT BLD-MCNC: 1.23 MG/DL (ref 0.76–1.27)
DEPRECATED RDW RBC AUTO: 46.6 FL (ref 37–54)
EOSINOPHIL # BLD AUTO: 0.27 10*3/MM3 (ref 0–0.4)
EOSINOPHIL NFR BLD AUTO: 6.4 % (ref 0.3–6.2)
ERYTHROCYTE [DISTWIDTH] IN BLOOD BY AUTOMATED COUNT: 13.7 % (ref 12.3–15.4)
GFR SERPL CREATININE-BSD FRML MDRD: 59 ML/MIN/1.73
GLUCOSE BLD-MCNC: 103 MG/DL (ref 65–99)
GLUCOSE BLDC GLUCOMTR-MCNC: 100 MG/DL (ref 70–130)
GLUCOSE BLDC GLUCOMTR-MCNC: 101 MG/DL (ref 70–130)
GLUCOSE BLDC GLUCOMTR-MCNC: 117 MG/DL (ref 70–130)
HCT VFR BLD AUTO: 31.7 % (ref 37.5–51)
HGB BLD-MCNC: 9.7 G/DL (ref 13–17.7)
IMM GRANULOCYTES # BLD AUTO: 0.02 10*3/MM3 (ref 0–0.05)
IMM GRANULOCYTES NFR BLD AUTO: 0.5 % (ref 0–0.5)
INR PPP: 1.3 (ref 0.9–1.1)
LYMPHOCYTES # BLD AUTO: 0.69 10*3/MM3 (ref 0.7–3.1)
LYMPHOCYTES NFR BLD AUTO: 16.4 % (ref 19.6–45.3)
MCH RBC QN AUTO: 28.9 PG (ref 26.6–33)
MCHC RBC AUTO-ENTMCNC: 30.6 G/DL (ref 31.5–35.7)
MCV RBC AUTO: 94.3 FL (ref 79–97)
MONOCYTES # BLD AUTO: 0.43 10*3/MM3 (ref 0.1–0.9)
MONOCYTES NFR BLD AUTO: 10.2 % (ref 5–12)
NEUTROPHILS # BLD AUTO: 2.78 10*3/MM3 (ref 1.7–7)
NEUTROPHILS NFR BLD AUTO: 65.8 % (ref 42.7–76)
NRBC BLD AUTO-RTO: 0 /100 WBC (ref 0–0.2)
PLATELET # BLD AUTO: 92 10*3/MM3 (ref 140–450)
PMV BLD AUTO: 12.4 FL (ref 6–12)
POTASSIUM BLD-SCNC: 4.3 MMOL/L (ref 3.5–5.2)
PROTHROMBIN TIME: 15.9 SECONDS (ref 11.7–14.2)
RBC # BLD AUTO: 3.36 10*6/MM3 (ref 4.14–5.8)
SODIUM BLD-SCNC: 140 MMOL/L (ref 136–145)
WBC NRBC COR # BLD: 4.22 10*3/MM3 (ref 3.4–10.8)

## 2019-04-22 PROCEDURE — 99232 SBSQ HOSP IP/OBS MODERATE 35: CPT | Performed by: PSYCHIATRY & NEUROLOGY

## 2019-04-22 PROCEDURE — 85610 PROTHROMBIN TIME: CPT | Performed by: PSYCHIATRY & NEUROLOGY

## 2019-04-22 PROCEDURE — 82962 GLUCOSE BLOOD TEST: CPT

## 2019-04-22 PROCEDURE — 36415 COLL VENOUS BLD VENIPUNCTURE: CPT | Performed by: NURSE PRACTITIONER

## 2019-04-22 PROCEDURE — 85730 THROMBOPLASTIN TIME PARTIAL: CPT | Performed by: PSYCHIATRY & NEUROLOGY

## 2019-04-22 PROCEDURE — 97110 THERAPEUTIC EXERCISES: CPT

## 2019-04-22 PROCEDURE — 85730 THROMBOPLASTIN TIME PARTIAL: CPT | Performed by: INTERNAL MEDICINE

## 2019-04-22 PROCEDURE — 85025 COMPLETE CBC W/AUTO DIFF WBC: CPT | Performed by: PSYCHIATRY & NEUROLOGY

## 2019-04-22 PROCEDURE — 92610 EVALUATE SWALLOWING FUNCTION: CPT

## 2019-04-22 PROCEDURE — 99232 SBSQ HOSP IP/OBS MODERATE 35: CPT | Performed by: INTERNAL MEDICINE

## 2019-04-22 PROCEDURE — 80048 BASIC METABOLIC PNL TOTAL CA: CPT | Performed by: NURSE PRACTITIONER

## 2019-04-22 RX ADMIN — CARVEDILOL 25 MG: 25 TABLET, FILM COATED ORAL at 13:45

## 2019-04-22 RX ADMIN — SODIUM CHLORIDE 75 ML/HR: 9 INJECTION, SOLUTION INTRAVENOUS at 19:53

## 2019-04-22 RX ADMIN — SODIUM CHLORIDE, PRESERVATIVE FREE 3 ML: 5 INJECTION INTRAVENOUS at 21:27

## 2019-04-22 RX ADMIN — APIXABAN 5 MG: 5 TABLET, FILM COATED ORAL at 21:25

## 2019-04-22 RX ADMIN — ATORVASTATIN CALCIUM 40 MG: 20 TABLET, FILM COATED ORAL at 21:25

## 2019-04-22 RX ADMIN — CARVEDILOL 25 MG: 25 TABLET, FILM COATED ORAL at 19:55

## 2019-04-22 RX ADMIN — SODIUM CHLORIDE 75 ML/HR: 9 INJECTION, SOLUTION INTRAVENOUS at 02:40

## 2019-04-23 ENCOUNTER — APPOINTMENT (OUTPATIENT)
Dept: CT IMAGING | Facility: HOSPITAL | Age: 69
End: 2019-04-23

## 2019-04-23 ENCOUNTER — APPOINTMENT (OUTPATIENT)
Dept: MRI IMAGING | Facility: HOSPITAL | Age: 69
End: 2019-04-23

## 2019-04-23 LAB
ANION GAP SERPL CALCULATED.3IONS-SCNC: 13.1 MMOL/L
APTT PPP: 33.4 SECONDS (ref 22.7–35.4)
BASOPHILS # BLD AUTO: 0.03 10*3/MM3 (ref 0–0.2)
BASOPHILS NFR BLD AUTO: 0.7 % (ref 0–1.5)
BUN BLD-MCNC: 18 MG/DL (ref 8–23)
BUN/CREAT SERPL: 13.7 (ref 7–25)
CALCIUM SPEC-SCNC: 8.6 MG/DL (ref 8.6–10.5)
CHLORIDE SERPL-SCNC: 106 MMOL/L (ref 98–107)
CO2 SERPL-SCNC: 20.9 MMOL/L (ref 22–29)
CREAT BLD-MCNC: 1.31 MG/DL (ref 0.76–1.27)
DEPRECATED RDW RBC AUTO: 44.8 FL (ref 37–54)
EOSINOPHIL # BLD AUTO: 0.32 10*3/MM3 (ref 0–0.4)
EOSINOPHIL NFR BLD AUTO: 7.2 % (ref 0.3–6.2)
ERYTHROCYTE [DISTWIDTH] IN BLOOD BY AUTOMATED COUNT: 13.9 % (ref 12.3–15.4)
GFR SERPL CREATININE-BSD FRML MDRD: 55 ML/MIN/1.73
GLUCOSE BLD-MCNC: 90 MG/DL (ref 65–99)
GLUCOSE BLDC GLUCOMTR-MCNC: 102 MG/DL (ref 70–130)
GLUCOSE BLDC GLUCOMTR-MCNC: 109 MG/DL (ref 70–130)
GLUCOSE BLDC GLUCOMTR-MCNC: 110 MG/DL (ref 70–130)
GLUCOSE BLDC GLUCOMTR-MCNC: 90 MG/DL (ref 70–130)
GLUCOSE BLDC GLUCOMTR-MCNC: 99 MG/DL (ref 70–130)
HCT VFR BLD AUTO: 31.3 % (ref 37.5–51)
HGB BLD-MCNC: 9.9 G/DL (ref 13–17.7)
IMM GRANULOCYTES # BLD AUTO: 0.03 10*3/MM3 (ref 0–0.05)
IMM GRANULOCYTES NFR BLD AUTO: 0.7 % (ref 0–0.5)
INR PPP: 1.34 (ref 0.9–1.1)
LYMPHOCYTES # BLD AUTO: 0.79 10*3/MM3 (ref 0.7–3.1)
LYMPHOCYTES NFR BLD AUTO: 17.8 % (ref 19.6–45.3)
MCH RBC QN AUTO: 28.6 PG (ref 26.6–33)
MCHC RBC AUTO-ENTMCNC: 31.6 G/DL (ref 31.5–35.7)
MCV RBC AUTO: 90.5 FL (ref 79–97)
MONOCYTES # BLD AUTO: 0.4 10*3/MM3 (ref 0.1–0.9)
MONOCYTES NFR BLD AUTO: 9 % (ref 5–12)
NEUTROPHILS # BLD AUTO: 2.88 10*3/MM3 (ref 1.7–7)
NEUTROPHILS NFR BLD AUTO: 64.6 % (ref 42.7–76)
NRBC BLD AUTO-RTO: 0 /100 WBC (ref 0–0.2)
PLATELET # BLD AUTO: 98 10*3/MM3 (ref 140–450)
PMV BLD AUTO: 12.4 FL (ref 6–12)
POTASSIUM BLD-SCNC: 4.3 MMOL/L (ref 3.5–5.2)
PROTHROMBIN TIME: 16.2 SECONDS (ref 11.7–14.2)
RBC # BLD AUTO: 3.46 10*6/MM3 (ref 4.14–5.8)
SODIUM BLD-SCNC: 140 MMOL/L (ref 136–145)
WBC NRBC COR # BLD: 4.45 10*3/MM3 (ref 3.4–10.8)

## 2019-04-23 PROCEDURE — 97110 THERAPEUTIC EXERCISES: CPT

## 2019-04-23 PROCEDURE — 80048 BASIC METABOLIC PNL TOTAL CA: CPT | Performed by: NURSE PRACTITIONER

## 2019-04-23 PROCEDURE — 70544 MR ANGIOGRAPHY HEAD W/O DYE: CPT

## 2019-04-23 PROCEDURE — 92526 ORAL FUNCTION THERAPY: CPT

## 2019-04-23 PROCEDURE — 70547 MR ANGIOGRAPHY NECK W/O DYE: CPT

## 2019-04-23 PROCEDURE — 99232 SBSQ HOSP IP/OBS MODERATE 35: CPT | Performed by: PSYCHIATRY & NEUROLOGY

## 2019-04-23 PROCEDURE — 85730 THROMBOPLASTIN TIME PARTIAL: CPT | Performed by: PSYCHIATRY & NEUROLOGY

## 2019-04-23 PROCEDURE — 85025 COMPLETE CBC W/AUTO DIFF WBC: CPT | Performed by: PSYCHIATRY & NEUROLOGY

## 2019-04-23 PROCEDURE — 70450 CT HEAD/BRAIN W/O DYE: CPT

## 2019-04-23 PROCEDURE — 82962 GLUCOSE BLOOD TEST: CPT

## 2019-04-23 PROCEDURE — 70551 MRI BRAIN STEM W/O DYE: CPT

## 2019-04-23 PROCEDURE — 85610 PROTHROMBIN TIME: CPT | Performed by: PSYCHIATRY & NEUROLOGY

## 2019-04-23 RX ADMIN — ATORVASTATIN CALCIUM 40 MG: 20 TABLET, FILM COATED ORAL at 21:33

## 2019-04-23 RX ADMIN — CARVEDILOL 25 MG: 25 TABLET, FILM COATED ORAL at 12:07

## 2019-04-23 RX ADMIN — APIXABAN 5 MG: 5 TABLET, FILM COATED ORAL at 12:07

## 2019-04-23 RX ADMIN — APIXABAN 5 MG: 5 TABLET, FILM COATED ORAL at 21:33

## 2019-04-23 RX ADMIN — CARVEDILOL 25 MG: 25 TABLET, FILM COATED ORAL at 18:33

## 2019-04-23 RX ADMIN — SODIUM CHLORIDE, PRESERVATIVE FREE 3 ML: 5 INJECTION INTRAVENOUS at 21:33

## 2019-04-24 ENCOUNTER — APPOINTMENT (OUTPATIENT)
Dept: NEUROLOGY | Facility: HOSPITAL | Age: 69
End: 2019-04-24

## 2019-04-24 ENCOUNTER — APPOINTMENT (OUTPATIENT)
Dept: GENERAL RADIOLOGY | Facility: HOSPITAL | Age: 69
End: 2019-04-24

## 2019-04-24 LAB
ANION GAP SERPL CALCULATED.3IONS-SCNC: 13.5 MMOL/L
APTT PPP: 38 SECONDS (ref 22.7–35.4)
BASOPHILS # BLD AUTO: 0.02 10*3/MM3 (ref 0–0.2)
BASOPHILS NFR BLD AUTO: 0.4 % (ref 0–1.5)
BUN BLD-MCNC: 16 MG/DL (ref 8–23)
BUN/CREAT SERPL: 13 (ref 7–25)
CALCIUM SPEC-SCNC: 8.7 MG/DL (ref 8.6–10.5)
CHLORIDE SERPL-SCNC: 110 MMOL/L (ref 98–107)
CO2 SERPL-SCNC: 19.5 MMOL/L (ref 22–29)
CREAT BLD-MCNC: 1.23 MG/DL (ref 0.76–1.27)
DEPRECATED RDW RBC AUTO: 43.8 FL (ref 37–54)
EOSINOPHIL # BLD AUTO: 0.33 10*3/MM3 (ref 0–0.4)
EOSINOPHIL NFR BLD AUTO: 6.7 % (ref 0.3–6.2)
ERYTHROCYTE [DISTWIDTH] IN BLOOD BY AUTOMATED COUNT: 13.9 % (ref 12.3–15.4)
GFR SERPL CREATININE-BSD FRML MDRD: 59 ML/MIN/1.73
GLUCOSE BLD-MCNC: 94 MG/DL (ref 65–99)
GLUCOSE BLDC GLUCOMTR-MCNC: 105 MG/DL (ref 70–130)
GLUCOSE BLDC GLUCOMTR-MCNC: 119 MG/DL (ref 70–130)
GLUCOSE BLDC GLUCOMTR-MCNC: 131 MG/DL (ref 70–130)
GLUCOSE BLDC GLUCOMTR-MCNC: 201 MG/DL (ref 70–130)
GLUCOSE BLDC GLUCOMTR-MCNC: 99 MG/DL (ref 70–130)
HCT VFR BLD AUTO: 31.2 % (ref 37.5–51)
HGB BLD-MCNC: 10.1 G/DL (ref 13–17.7)
IMM GRANULOCYTES # BLD AUTO: 0.02 10*3/MM3 (ref 0–0.05)
IMM GRANULOCYTES NFR BLD AUTO: 0.4 % (ref 0–0.5)
INR PPP: 1.53 (ref 0.9–1.1)
LYMPHOCYTES # BLD AUTO: 0.84 10*3/MM3 (ref 0.7–3.1)
LYMPHOCYTES NFR BLD AUTO: 17.1 % (ref 19.6–45.3)
MCH RBC QN AUTO: 28.9 PG (ref 26.6–33)
MCHC RBC AUTO-ENTMCNC: 32.4 G/DL (ref 31.5–35.7)
MCV RBC AUTO: 89.1 FL (ref 79–97)
MONOCYTES # BLD AUTO: 0.45 10*3/MM3 (ref 0.1–0.9)
MONOCYTES NFR BLD AUTO: 9.1 % (ref 5–12)
NEUTROPHILS # BLD AUTO: 3.26 10*3/MM3 (ref 1.7–7)
NEUTROPHILS NFR BLD AUTO: 66.3 % (ref 42.7–76)
NRBC BLD AUTO-RTO: 0 /100 WBC (ref 0–0.2)
PLATELET # BLD AUTO: 111 10*3/MM3 (ref 140–450)
PMV BLD AUTO: 12.6 FL (ref 6–12)
POTASSIUM BLD-SCNC: 4.2 MMOL/L (ref 3.5–5.2)
PROTHROMBIN TIME: 18 SECONDS (ref 11.7–14.2)
RBC # BLD AUTO: 3.5 10*6/MM3 (ref 4.14–5.8)
SODIUM BLD-SCNC: 143 MMOL/L (ref 136–145)
WBC NRBC COR # BLD: 4.92 10*3/MM3 (ref 3.4–10.8)

## 2019-04-24 PROCEDURE — 82962 GLUCOSE BLOOD TEST: CPT

## 2019-04-24 PROCEDURE — 92611 MOTION FLUOROSCOPY/SWALLOW: CPT

## 2019-04-24 PROCEDURE — 80048 BASIC METABOLIC PNL TOTAL CA: CPT | Performed by: NURSE PRACTITIONER

## 2019-04-24 PROCEDURE — 85025 COMPLETE CBC W/AUTO DIFF WBC: CPT | Performed by: PSYCHIATRY & NEUROLOGY

## 2019-04-24 PROCEDURE — 36415 COLL VENOUS BLD VENIPUNCTURE: CPT | Performed by: NURSE PRACTITIONER

## 2019-04-24 PROCEDURE — 95819 EEG AWAKE AND ASLEEP: CPT | Performed by: PSYCHIATRY & NEUROLOGY

## 2019-04-24 PROCEDURE — 85730 THROMBOPLASTIN TIME PARTIAL: CPT | Performed by: PSYCHIATRY & NEUROLOGY

## 2019-04-24 PROCEDURE — 99232 SBSQ HOSP IP/OBS MODERATE 35: CPT | Performed by: PSYCHIATRY & NEUROLOGY

## 2019-04-24 PROCEDURE — 74230 X-RAY XM SWLNG FUNCJ C+: CPT

## 2019-04-24 PROCEDURE — 95816 EEG AWAKE AND DROWSY: CPT

## 2019-04-24 PROCEDURE — 85610 PROTHROMBIN TIME: CPT | Performed by: PSYCHIATRY & NEUROLOGY

## 2019-04-24 RX ORDER — CETIRIZINE HYDROCHLORIDE 10 MG/1
10 TABLET ORAL DAILY PRN
Status: DISCONTINUED | OUTPATIENT
Start: 2019-04-24 | End: 2019-04-26 | Stop reason: HOSPADM

## 2019-04-24 RX ORDER — AMLODIPINE BESYLATE 2.5 MG/1
2.5 TABLET ORAL
Status: DISCONTINUED | OUTPATIENT
Start: 2019-04-24 | End: 2019-04-26 | Stop reason: HOSPADM

## 2019-04-24 RX ADMIN — Medication 1 SPRAY: at 10:24

## 2019-04-24 RX ADMIN — BARIUM SULFATE 50 ML: 400 SUSPENSION ORAL at 14:29

## 2019-04-24 RX ADMIN — ATORVASTATIN CALCIUM 40 MG: 20 TABLET, FILM COATED ORAL at 21:20

## 2019-04-24 RX ADMIN — CARVEDILOL 25 MG: 25 TABLET, FILM COATED ORAL at 18:40

## 2019-04-24 RX ADMIN — CETIRIZINE HYDROCHLORIDE 10 MG: 10 TABLET, FILM COATED ORAL at 18:40

## 2019-04-24 RX ADMIN — BARIUM SULFATE 130 ML: 960 POWDER, FOR SUSPENSION ORAL at 14:28

## 2019-04-24 RX ADMIN — SODIUM CHLORIDE, PRESERVATIVE FREE 3 ML: 5 INJECTION INTRAVENOUS at 10:29

## 2019-04-24 RX ADMIN — APIXABAN 5 MG: 5 TABLET, FILM COATED ORAL at 21:20

## 2019-04-24 RX ADMIN — SODIUM CHLORIDE, PRESERVATIVE FREE 3 ML: 5 INJECTION INTRAVENOUS at 21:20

## 2019-04-24 RX ADMIN — APIXABAN 5 MG: 5 TABLET, FILM COATED ORAL at 10:24

## 2019-04-24 RX ADMIN — CARVEDILOL 25 MG: 25 TABLET, FILM COATED ORAL at 10:24

## 2019-04-24 RX ADMIN — BARIUM SULFATE 4 ML: 980 POWDER, FOR SUSPENSION ORAL at 14:28

## 2019-04-24 RX ADMIN — AMLODIPINE BESYLATE 2.5 MG: 2.5 TABLET ORAL at 18:41

## 2019-04-24 RX ADMIN — Medication 1 SPRAY: at 18:41

## 2019-04-25 LAB
ALBUMIN SERPL-MCNC: 3 G/DL (ref 3.5–5.2)
ALBUMIN/GLOB SERPL: 1 G/DL
ALP SERPL-CCNC: 46 U/L (ref 39–117)
ALT SERPL W P-5'-P-CCNC: 15 U/L (ref 1–41)
ANION GAP SERPL CALCULATED.3IONS-SCNC: 8 MMOL/L
APTT PPP: 34.6 SECONDS (ref 22.7–35.4)
AST SERPL-CCNC: 13 U/L (ref 1–40)
BILIRUB SERPL-MCNC: 0.8 MG/DL (ref 0.2–1.2)
BUN BLD-MCNC: 13 MG/DL (ref 8–23)
BUN/CREAT SERPL: 10.3 (ref 7–25)
CALCIUM SPEC-SCNC: 8.6 MG/DL (ref 8.6–10.5)
CHLORIDE SERPL-SCNC: 109 MMOL/L (ref 98–107)
CO2 SERPL-SCNC: 23 MMOL/L (ref 22–29)
CREAT BLD-MCNC: 1.26 MG/DL (ref 0.76–1.27)
GFR SERPL CREATININE-BSD FRML MDRD: 57 ML/MIN/1.73
GLOBULIN UR ELPH-MCNC: 3 GM/DL
GLUCOSE BLD-MCNC: 96 MG/DL (ref 65–99)
GLUCOSE BLDC GLUCOMTR-MCNC: 101 MG/DL (ref 70–130)
GLUCOSE BLDC GLUCOMTR-MCNC: 104 MG/DL (ref 70–130)
GLUCOSE BLDC GLUCOMTR-MCNC: 138 MG/DL (ref 70–130)
GLUCOSE BLDC GLUCOMTR-MCNC: 162 MG/DL (ref 70–130)
INR PPP: 1.42 (ref 0.9–1.1)
POTASSIUM BLD-SCNC: 4 MMOL/L (ref 3.5–5.2)
PROT SERPL-MCNC: 6 G/DL (ref 6–8.5)
PROTHROMBIN TIME: 17 SECONDS (ref 11.7–14.2)
SODIUM BLD-SCNC: 140 MMOL/L (ref 136–145)

## 2019-04-25 PROCEDURE — 80053 COMPREHEN METABOLIC PANEL: CPT | Performed by: INTERNAL MEDICINE

## 2019-04-25 PROCEDURE — 82962 GLUCOSE BLOOD TEST: CPT

## 2019-04-25 PROCEDURE — 99233 SBSQ HOSP IP/OBS HIGH 50: CPT | Performed by: NURSE PRACTITIONER

## 2019-04-25 PROCEDURE — 85610 PROTHROMBIN TIME: CPT | Performed by: PSYCHIATRY & NEUROLOGY

## 2019-04-25 PROCEDURE — 63710000001 INSULIN LISPRO (HUMAN) PER 5 UNITS: Performed by: NURSE PRACTITIONER

## 2019-04-25 PROCEDURE — 97110 THERAPEUTIC EXERCISES: CPT

## 2019-04-25 PROCEDURE — 85730 THROMBOPLASTIN TIME PARTIAL: CPT | Performed by: PSYCHIATRY & NEUROLOGY

## 2019-04-25 RX ORDER — TORSEMIDE 20 MG/1
20 TABLET ORAL DAILY
Status: DISCONTINUED | OUTPATIENT
Start: 2019-04-25 | End: 2019-04-26 | Stop reason: HOSPADM

## 2019-04-25 RX ADMIN — TORSEMIDE 20 MG: 20 TABLET ORAL at 18:07

## 2019-04-25 RX ADMIN — AMLODIPINE BESYLATE 2.5 MG: 2.5 TABLET ORAL at 09:07

## 2019-04-25 RX ADMIN — SODIUM CHLORIDE, PRESERVATIVE FREE 3 ML: 5 INJECTION INTRAVENOUS at 22:28

## 2019-04-25 RX ADMIN — SODIUM CHLORIDE, PRESERVATIVE FREE 3 ML: 5 INJECTION INTRAVENOUS at 09:08

## 2019-04-25 RX ADMIN — CARVEDILOL 25 MG: 25 TABLET, FILM COATED ORAL at 18:07

## 2019-04-25 RX ADMIN — INSULIN LISPRO 2 UNITS: 100 INJECTION, SOLUTION INTRAVENOUS; SUBCUTANEOUS at 18:07

## 2019-04-25 RX ADMIN — APIXABAN 5 MG: 5 TABLET, FILM COATED ORAL at 21:42

## 2019-04-25 RX ADMIN — SODIUM CHLORIDE, PRESERVATIVE FREE 3 ML: 5 INJECTION INTRAVENOUS at 10:35

## 2019-04-25 RX ADMIN — CARVEDILOL 25 MG: 25 TABLET, FILM COATED ORAL at 09:07

## 2019-04-25 RX ADMIN — ATORVASTATIN CALCIUM 40 MG: 20 TABLET, FILM COATED ORAL at 21:41

## 2019-04-25 RX ADMIN — APIXABAN 5 MG: 5 TABLET, FILM COATED ORAL at 09:07

## 2019-04-26 VITALS
HEIGHT: 74 IN | WEIGHT: 286.8 LBS | SYSTOLIC BLOOD PRESSURE: 129 MMHG | RESPIRATION RATE: 18 BRPM | BODY MASS INDEX: 36.81 KG/M2 | DIASTOLIC BLOOD PRESSURE: 93 MMHG | HEART RATE: 103 BPM | TEMPERATURE: 98.7 F | OXYGEN SATURATION: 99 %

## 2019-04-26 LAB
ANION GAP SERPL CALCULATED.3IONS-SCNC: 10 MMOL/L
APTT PPP: 38 SECONDS (ref 22.7–35.4)
BASOPHILS # BLD AUTO: 0.02 10*3/MM3 (ref 0–0.2)
BASOPHILS NFR BLD AUTO: 0.4 % (ref 0–1.5)
BUN BLD-MCNC: 15 MG/DL (ref 8–23)
BUN/CREAT SERPL: 11.5 (ref 7–25)
CALCIUM SPEC-SCNC: 9.1 MG/DL (ref 8.6–10.5)
CHLORIDE SERPL-SCNC: 107 MMOL/L (ref 98–107)
CO2 SERPL-SCNC: 23 MMOL/L (ref 22–29)
CREAT BLD-MCNC: 1.31 MG/DL (ref 0.76–1.27)
DEPRECATED RDW RBC AUTO: 43.9 FL (ref 37–54)
EOSINOPHIL # BLD AUTO: 0.15 10*3/MM3 (ref 0–0.4)
EOSINOPHIL NFR BLD AUTO: 3.1 % (ref 0.3–6.2)
ERYTHROCYTE [DISTWIDTH] IN BLOOD BY AUTOMATED COUNT: 13.8 % (ref 12.3–15.4)
GFR SERPL CREATININE-BSD FRML MDRD: 55 ML/MIN/1.73
GLUCOSE BLD-MCNC: 122 MG/DL (ref 65–99)
GLUCOSE BLDC GLUCOMTR-MCNC: 119 MG/DL (ref 70–130)
GLUCOSE BLDC GLUCOMTR-MCNC: 137 MG/DL (ref 70–130)
HCT VFR BLD AUTO: 30.2 % (ref 37.5–51)
HGB BLD-MCNC: 9.9 G/DL (ref 13–17.7)
IMM GRANULOCYTES # BLD AUTO: 0.03 10*3/MM3 (ref 0–0.05)
IMM GRANULOCYTES NFR BLD AUTO: 0.6 % (ref 0–0.5)
INR PPP: 1.67 (ref 0.9–1.1)
LYMPHOCYTES # BLD AUTO: 0.73 10*3/MM3 (ref 0.7–3.1)
LYMPHOCYTES NFR BLD AUTO: 15.2 % (ref 19.6–45.3)
MCH RBC QN AUTO: 29.1 PG (ref 26.6–33)
MCHC RBC AUTO-ENTMCNC: 32.8 G/DL (ref 31.5–35.7)
MCV RBC AUTO: 88.8 FL (ref 79–97)
MONOCYTES # BLD AUTO: 0.39 10*3/MM3 (ref 0.1–0.9)
MONOCYTES NFR BLD AUTO: 8.1 % (ref 5–12)
NEUTROPHILS # BLD AUTO: 3.48 10*3/MM3 (ref 1.7–7)
NEUTROPHILS NFR BLD AUTO: 72.6 % (ref 42.7–76)
NRBC BLD AUTO-RTO: 0 /100 WBC (ref 0–0.2)
PLATELET # BLD AUTO: 111 10*3/MM3 (ref 140–450)
PMV BLD AUTO: 12.3 FL (ref 6–12)
POTASSIUM BLD-SCNC: 3.7 MMOL/L (ref 3.5–5.2)
PROTHROMBIN TIME: 19.4 SECONDS (ref 11.7–14.2)
RBC # BLD AUTO: 3.4 10*6/MM3 (ref 4.14–5.8)
SODIUM BLD-SCNC: 140 MMOL/L (ref 136–145)
WBC NRBC COR # BLD: 4.8 10*3/MM3 (ref 3.4–10.8)

## 2019-04-26 PROCEDURE — 97110 THERAPEUTIC EXERCISES: CPT

## 2019-04-26 PROCEDURE — 85025 COMPLETE CBC W/AUTO DIFF WBC: CPT | Performed by: HOSPITALIST

## 2019-04-26 PROCEDURE — 80048 BASIC METABOLIC PNL TOTAL CA: CPT | Performed by: NURSE PRACTITIONER

## 2019-04-26 PROCEDURE — 36415 COLL VENOUS BLD VENIPUNCTURE: CPT | Performed by: NURSE PRACTITIONER

## 2019-04-26 PROCEDURE — 85610 PROTHROMBIN TIME: CPT | Performed by: PSYCHIATRY & NEUROLOGY

## 2019-04-26 PROCEDURE — 85730 THROMBOPLASTIN TIME PARTIAL: CPT | Performed by: PSYCHIATRY & NEUROLOGY

## 2019-04-26 PROCEDURE — 82962 GLUCOSE BLOOD TEST: CPT

## 2019-04-26 RX ORDER — CARVEDILOL 25 MG/1
25 TABLET ORAL 2 TIMES DAILY WITH MEALS
Start: 2019-04-26

## 2019-04-26 RX ORDER — ATORVASTATIN CALCIUM 40 MG/1
40 TABLET, FILM COATED ORAL NIGHTLY
Start: 2019-04-26

## 2019-04-26 RX ORDER — AMLODIPINE BESYLATE 2.5 MG/1
2.5 TABLET ORAL
Start: 2019-04-27 | End: 2019-05-23 | Stop reason: HOSPADM

## 2019-04-26 RX ORDER — TORSEMIDE 20 MG/1
20 TABLET ORAL DAILY
Start: 2019-04-27

## 2019-04-26 RX ADMIN — AMLODIPINE BESYLATE 2.5 MG: 2.5 TABLET ORAL at 08:22

## 2019-04-26 RX ADMIN — CARVEDILOL 25 MG: 25 TABLET, FILM COATED ORAL at 08:22

## 2019-04-26 RX ADMIN — APIXABAN 5 MG: 5 TABLET, FILM COATED ORAL at 08:22

## 2019-04-26 RX ADMIN — SODIUM CHLORIDE, PRESERVATIVE FREE 3 ML: 5 INJECTION INTRAVENOUS at 08:22

## 2019-04-26 RX ADMIN — SODIUM CHLORIDE, PRESERVATIVE FREE 3 ML: 5 INJECTION INTRAVENOUS at 10:39

## 2019-04-26 RX ADMIN — TORSEMIDE 20 MG: 20 TABLET ORAL at 08:22

## 2019-05-07 ENCOUNTER — TELEPHONE (OUTPATIENT)
Dept: NEUROLOGY | Facility: CLINIC | Age: 69
End: 2019-05-07

## 2019-05-07 DIAGNOSIS — I67.9 CEREBROVASCULAR DISEASE: Primary | ICD-10-CM

## 2019-05-07 NOTE — TELEPHONE ENCOUNTER
----- Message from DOLORES Richard sent at 5/7/2019 11:37 AM EDT -----  Regarding: RE: meds clarification   Yes.    Thanks.  ----- Message -----  From: Helena Crocker RN  Sent: 5/6/2019   5:02 PM  To: DOLORES Richard  Subject: meds clarification                               Nakia,   This patient was seen for  Stroke, left PCA with thalamocapsular involvement.    Per your last note patient to be on Eliquis 5mg and Aspirin 81mg x1 month then Eliquis monotherapy.    It looks like the patient was discharged on Eliquis only.     Would you like the patient to be place on Aspirin 81mg e4juocy  now?    Thank you

## 2019-05-07 NOTE — TELEPHONE ENCOUNTER
Two Week Stroke Phone Call  Spoke with the patient's nurse.  Notified her that the patient should have been discharged taking Aspirin 81mg x30days.  I had verified with Nakia BERNAL, who does want Aspiring 81mg x30days.  Nurse requested order to be faxed, will fax as soon order is signed.     · Admission Date: 4/18/2019  · Discharge Date: 4/26/2019  · Discharge Destination: [place]  · Meds reviewed with patient/caregiver?    [x]Yes [] No   Facility is managing  o Antiplatelet: Aspirin  See above  o Cholesterol Reducing: Lipitor  o Anit-Coagulate: Eliquis    · Is the patient taking all medication as directed?   [x]  Yes  []  No  • Discussed signs and symptoms of stroke and when patient to call 911?   [x]  Yes []  No  o Sudden weakness or numbness of the face, arm, or leg especially on one side of the body  o Sudden confusion, trouble speaking or understanding  o Sudden trouble seeing in one or both eyes   o Sudden trouble walking, dizziness, loss of balance or coordination  o Sudden severe headaches with no known cause    Notified Patient that if any of these symptoms occur to call 911  · Does the patient have any new signs or symptoms of a stroke?   []  Yes     [x]  No  · Does the patient have an appointment with PCP?  Being seen by facility   · Does the patient have 3 month Stroke Clinic appointment?  Patient sees Dr. Mcgovern from [facility]

## 2019-05-19 ENCOUNTER — APPOINTMENT (OUTPATIENT)
Dept: GENERAL RADIOLOGY | Facility: HOSPITAL | Age: 69
End: 2019-05-19

## 2019-05-19 ENCOUNTER — APPOINTMENT (OUTPATIENT)
Dept: CT IMAGING | Facility: HOSPITAL | Age: 69
End: 2019-05-19

## 2019-05-19 ENCOUNTER — HOSPITAL ENCOUNTER (INPATIENT)
Facility: HOSPITAL | Age: 69
LOS: 2 days | Discharge: NURSING, REHAB OR LONG-TERM CARE | End: 2019-05-23
Attending: EMERGENCY MEDICINE | Admitting: HOSPITALIST

## 2019-05-19 DIAGNOSIS — D64.9 CHRONIC ANEMIA: ICD-10-CM

## 2019-05-19 DIAGNOSIS — Z86.39 HISTORY OF DIABETES MELLITUS: ICD-10-CM

## 2019-05-19 DIAGNOSIS — E86.0 DEHYDRATION: Primary | ICD-10-CM

## 2019-05-19 DIAGNOSIS — Z86.73 HISTORY OF CVA (CEREBROVASCULAR ACCIDENT): ICD-10-CM

## 2019-05-19 DIAGNOSIS — I48.20 CHRONIC ATRIAL FIBRILLATION (HCC): ICD-10-CM

## 2019-05-19 DIAGNOSIS — G93.40 ENCEPHALOPATHY: ICD-10-CM

## 2019-05-19 PROBLEM — R41.82 ALTERED MENTAL STATUS: Status: ACTIVE | Noted: 2019-05-19

## 2019-05-19 PROBLEM — R09.02 HYPOXIA: Status: ACTIVE | Noted: 2019-05-19

## 2019-05-19 LAB
ALBUMIN SERPL-MCNC: 3.2 G/DL (ref 3.5–5.2)
ALBUMIN/GLOB SERPL: 0.9 G/DL
ALP SERPL-CCNC: 41 U/L (ref 39–117)
ALT SERPL W P-5'-P-CCNC: 21 U/L (ref 1–41)
ANION GAP SERPL CALCULATED.3IONS-SCNC: 13.2 MMOL/L
AST SERPL-CCNC: 23 U/L (ref 1–40)
BASOPHILS # BLD AUTO: 0.03 10*3/MM3 (ref 0–0.2)
BASOPHILS NFR BLD AUTO: 0.3 % (ref 0–1.5)
BILIRUB SERPL-MCNC: 1 MG/DL (ref 0.2–1.2)
BUN BLD-MCNC: 27 MG/DL (ref 8–23)
BUN/CREAT SERPL: 12.4 (ref 7–25)
CALCIUM SPEC-SCNC: 9.7 MG/DL (ref 8.6–10.5)
CHLORIDE SERPL-SCNC: 100 MMOL/L (ref 98–107)
CO2 SERPL-SCNC: 28.8 MMOL/L (ref 22–29)
CREAT BLD-MCNC: 2.18 MG/DL (ref 0.76–1.27)
D-LACTATE SERPL-SCNC: 1.3 MMOL/L (ref 0.5–2)
DEPRECATED RDW RBC AUTO: 51 FL (ref 37–54)
EOSINOPHIL # BLD AUTO: 0.07 10*3/MM3 (ref 0–0.4)
EOSINOPHIL NFR BLD AUTO: 0.7 % (ref 0.3–6.2)
ERYTHROCYTE [DISTWIDTH] IN BLOOD BY AUTOMATED COUNT: 15.2 % (ref 12.3–15.4)
GFR SERPL CREATININE-BSD FRML MDRD: 30 ML/MIN/1.73
GLOBULIN UR ELPH-MCNC: 3.7 GM/DL
GLUCOSE BLD-MCNC: 87 MG/DL (ref 65–99)
GLUCOSE BLDC GLUCOMTR-MCNC: 46 MG/DL (ref 70–130)
GLUCOSE BLDC GLUCOMTR-MCNC: 59 MG/DL (ref 70–130)
GLUCOSE BLDC GLUCOMTR-MCNC: 60 MG/DL (ref 70–130)
GLUCOSE BLDC GLUCOMTR-MCNC: 79 MG/DL (ref 70–130)
GLUCOSE BLDC GLUCOMTR-MCNC: 79 MG/DL (ref 70–130)
HBA1C MFR BLD: 7.24 % (ref 4.8–5.6)
HCT VFR BLD AUTO: 30.5 % (ref 37.5–51)
HEMOCCULT STL QL: NEGATIVE
HGB BLD-MCNC: 9.4 G/DL (ref 13–17.7)
IMM GRANULOCYTES # BLD AUTO: 0.07 10*3/MM3 (ref 0–0.05)
IMM GRANULOCYTES NFR BLD AUTO: 0.7 % (ref 0–0.5)
LYMPHOCYTES # BLD AUTO: 0.34 10*3/MM3 (ref 0.7–3.1)
LYMPHOCYTES NFR BLD AUTO: 3.5 % (ref 19.6–45.3)
MAGNESIUM SERPL-MCNC: 1.8 MG/DL (ref 1.6–2.4)
MCH RBC QN AUTO: 28.7 PG (ref 26.6–33)
MCHC RBC AUTO-ENTMCNC: 30.8 G/DL (ref 31.5–35.7)
MCV RBC AUTO: 93 FL (ref 79–97)
MONOCYTES # BLD AUTO: 0.91 10*3/MM3 (ref 0.1–0.9)
MONOCYTES NFR BLD AUTO: 9.5 % (ref 5–12)
NEUTROPHILS # BLD AUTO: 8.18 10*3/MM3 (ref 1.7–7)
NEUTROPHILS NFR BLD AUTO: 85.3 % (ref 42.7–76)
NRBC BLD AUTO-RTO: 0 /100 WBC (ref 0–0.2)
PLATELET # BLD AUTO: 100 10*3/MM3 (ref 140–450)
PMV BLD AUTO: 12.3 FL (ref 6–12)
POTASSIUM BLD-SCNC: 4.1 MMOL/L (ref 3.5–5.2)
PROCALCITONIN SERPL-MCNC: 0.31 NG/ML (ref 0.1–0.25)
PROT SERPL-MCNC: 6.9 G/DL (ref 6–8.5)
RBC # BLD AUTO: 3.28 10*6/MM3 (ref 4.14–5.8)
SODIUM BLD-SCNC: 142 MMOL/L (ref 136–145)
TROPONIN T SERPL-MCNC: 0.03 NG/ML (ref 0–0.03)
WBC NRBC COR # BLD: 9.6 10*3/MM3 (ref 3.4–10.8)

## 2019-05-19 PROCEDURE — 82962 GLUCOSE BLOOD TEST: CPT

## 2019-05-19 PROCEDURE — 83605 ASSAY OF LACTIC ACID: CPT | Performed by: EMERGENCY MEDICINE

## 2019-05-19 PROCEDURE — 99285 EMERGENCY DEPT VISIT HI MDM: CPT

## 2019-05-19 PROCEDURE — 70450 CT HEAD/BRAIN W/O DYE: CPT

## 2019-05-19 PROCEDURE — 85025 COMPLETE CBC W/AUTO DIFF WBC: CPT | Performed by: EMERGENCY MEDICINE

## 2019-05-19 PROCEDURE — 83036 HEMOGLOBIN GLYCOSYLATED A1C: CPT | Performed by: HOSPITALIST

## 2019-05-19 PROCEDURE — G0378 HOSPITAL OBSERVATION PER HR: HCPCS

## 2019-05-19 PROCEDURE — 84484 ASSAY OF TROPONIN QUANT: CPT | Performed by: EMERGENCY MEDICINE

## 2019-05-19 PROCEDURE — 80053 COMPREHEN METABOLIC PANEL: CPT | Performed by: EMERGENCY MEDICINE

## 2019-05-19 PROCEDURE — 93010 ELECTROCARDIOGRAM REPORT: CPT | Performed by: INTERNAL MEDICINE

## 2019-05-19 PROCEDURE — 93005 ELECTROCARDIOGRAM TRACING: CPT | Performed by: EMERGENCY MEDICINE

## 2019-05-19 PROCEDURE — 84145 PROCALCITONIN (PCT): CPT | Performed by: EMERGENCY MEDICINE

## 2019-05-19 PROCEDURE — 71046 X-RAY EXAM CHEST 2 VIEWS: CPT

## 2019-05-19 PROCEDURE — 83735 ASSAY OF MAGNESIUM: CPT | Performed by: EMERGENCY MEDICINE

## 2019-05-19 PROCEDURE — 82272 OCCULT BLD FECES 1-3 TESTS: CPT | Performed by: EMERGENCY MEDICINE

## 2019-05-19 RX ORDER — DEXTROSE MONOHYDRATE 25 G/50ML
25 INJECTION, SOLUTION INTRAVENOUS
Status: DISCONTINUED | OUTPATIENT
Start: 2019-05-19 | End: 2019-05-23 | Stop reason: HOSPADM

## 2019-05-19 RX ORDER — DIVALPROEX SODIUM 125 MG/1
125 CAPSULE, COATED PELLETS ORAL EVERY 12 HOURS SCHEDULED
Status: DISCONTINUED | OUTPATIENT
Start: 2019-05-19 | End: 2019-05-21

## 2019-05-19 RX ORDER — SODIUM CHLORIDE 0.9 % (FLUSH) 0.9 %
3 SYRINGE (ML) INJECTION EVERY 12 HOURS SCHEDULED
Status: DISCONTINUED | OUTPATIENT
Start: 2019-05-19 | End: 2019-05-23 | Stop reason: HOSPADM

## 2019-05-19 RX ORDER — GLIPIZIDE 10 MG/1
10 TABLET ORAL EVERY MORNING
COMMUNITY

## 2019-05-19 RX ORDER — SODIUM CHLORIDE 0.9 % (FLUSH) 0.9 %
1-10 SYRINGE (ML) INJECTION AS NEEDED
Status: DISCONTINUED | OUTPATIENT
Start: 2019-05-19 | End: 2019-05-23 | Stop reason: HOSPADM

## 2019-05-19 RX ORDER — SODIUM CHLORIDE 9 MG/ML
75 INJECTION, SOLUTION INTRAVENOUS CONTINUOUS
Status: DISCONTINUED | OUTPATIENT
Start: 2019-05-19 | End: 2019-05-19

## 2019-05-19 RX ORDER — ACETAMINOPHEN 500 MG
500 TABLET ORAL EVERY 4 HOURS PRN
Status: DISCONTINUED | OUTPATIENT
Start: 2019-05-19 | End: 2019-05-23 | Stop reason: HOSPADM

## 2019-05-19 RX ORDER — DIVALPROEX SODIUM 125 MG/1
125 CAPSULE, COATED PELLETS ORAL 2 TIMES DAILY
COMMUNITY
End: 2019-05-23 | Stop reason: HOSPADM

## 2019-05-19 RX ORDER — NICOTINE POLACRILEX 4 MG
15 LOZENGE BUCCAL
Status: DISCONTINUED | OUTPATIENT
Start: 2019-05-19 | End: 2019-05-23 | Stop reason: HOSPADM

## 2019-05-19 RX ORDER — ASPIRIN 81 MG/1
324 TABLET, CHEWABLE ORAL ONCE
Status: COMPLETED | OUTPATIENT
Start: 2019-05-19 | End: 2019-05-19

## 2019-05-19 RX ORDER — DEXTROSE AND SODIUM CHLORIDE 5; .9 G/100ML; G/100ML
50 INJECTION, SOLUTION INTRAVENOUS CONTINUOUS
Status: DISCONTINUED | OUTPATIENT
Start: 2019-05-19 | End: 2019-05-21

## 2019-05-19 RX ORDER — CARVEDILOL 25 MG/1
25 TABLET ORAL 2 TIMES DAILY WITH MEALS
Status: DISCONTINUED | OUTPATIENT
Start: 2019-05-19 | End: 2019-05-23 | Stop reason: HOSPADM

## 2019-05-19 RX ORDER — GLIPIZIDE 5 MG/1
5 TABLET ORAL EVERY EVENING
COMMUNITY

## 2019-05-19 RX ADMIN — APIXABAN 5 MG: 5 TABLET, FILM COATED ORAL at 20:53

## 2019-05-19 RX ADMIN — CARVEDILOL 25 MG: 25 TABLET, FILM COATED ORAL at 20:53

## 2019-05-19 RX ADMIN — DIVALPROEX SODIUM 125 MG: 125 CAPSULE, COATED PELLETS ORAL at 20:53

## 2019-05-19 RX ADMIN — SODIUM CHLORIDE 500 ML: 9 INJECTION, SOLUTION INTRAVENOUS at 14:42

## 2019-05-19 RX ADMIN — SODIUM CHLORIDE, PRESERVATIVE FREE 3 ML: 5 INJECTION INTRAVENOUS at 20:54

## 2019-05-19 RX ADMIN — DEXTROSE 15 G: 15 GEL ORAL at 22:05

## 2019-05-19 RX ADMIN — ASPIRIN 324 MG: 81 TABLET, CHEWABLE ORAL at 16:22

## 2019-05-19 RX ADMIN — SODIUM CHLORIDE 75 ML/HR: 9 INJECTION, SOLUTION INTRAVENOUS at 19:25

## 2019-05-19 RX ADMIN — ACETAMINOPHEN 500 MG: 500 TABLET, FILM COATED ORAL at 21:54

## 2019-05-20 ENCOUNTER — APPOINTMENT (OUTPATIENT)
Dept: CARDIOLOGY | Facility: HOSPITAL | Age: 69
End: 2019-05-20

## 2019-05-20 LAB
ANION GAP SERPL CALCULATED.3IONS-SCNC: 11.7 MMOL/L
BACTERIA UR QL AUTO: ABNORMAL /HPF
BH CV LOW VAS LEFT GASTRONEMIUS VESSEL: 1
BH CV LOW VAS RIGHT GASTRONEMIUS VESSEL: 1
BH CV LOWER VASCULAR LEFT COMMON FEMORAL AUGMENT: NORMAL
BH CV LOWER VASCULAR LEFT COMMON FEMORAL COMPETENT: NORMAL
BH CV LOWER VASCULAR LEFT COMMON FEMORAL COMPRESS: NORMAL
BH CV LOWER VASCULAR LEFT COMMON FEMORAL PHASIC: NORMAL
BH CV LOWER VASCULAR LEFT COMMON FEMORAL SPONT: NORMAL
BH CV LOWER VASCULAR LEFT DISTAL FEMORAL COMPRESS: NORMAL
BH CV LOWER VASCULAR LEFT GASTRONEMIUS COMPRESS: NORMAL
BH CV LOWER VASCULAR LEFT GASTRONEMIUS THROMBUS: NORMAL
BH CV LOWER VASCULAR LEFT GREATER SAPH AK COMPRESS: NORMAL
BH CV LOWER VASCULAR LEFT GREATER SAPH BK COMPRESS: NORMAL
BH CV LOWER VASCULAR LEFT LESSER SAPH COMPRESS: NORMAL
BH CV LOWER VASCULAR LEFT MID FEMORAL AUGMENT: NORMAL
BH CV LOWER VASCULAR LEFT MID FEMORAL COMPETENT: NORMAL
BH CV LOWER VASCULAR LEFT MID FEMORAL COMPRESS: NORMAL
BH CV LOWER VASCULAR LEFT MID FEMORAL PHASIC: NORMAL
BH CV LOWER VASCULAR LEFT MID FEMORAL SPONT: NORMAL
BH CV LOWER VASCULAR LEFT PERONEAL COMPRESS: NORMAL
BH CV LOWER VASCULAR LEFT POPLITEAL AUGMENT: NORMAL
BH CV LOWER VASCULAR LEFT POPLITEAL COMPETENT: NORMAL
BH CV LOWER VASCULAR LEFT POPLITEAL COMPRESS: NORMAL
BH CV LOWER VASCULAR LEFT POPLITEAL PHASIC: NORMAL
BH CV LOWER VASCULAR LEFT POPLITEAL SPONT: NORMAL
BH CV LOWER VASCULAR LEFT POSTERIOR TIBIAL COMPRESS: NORMAL
BH CV LOWER VASCULAR LEFT PROXIMAL FEMORAL COMPRESS: NORMAL
BH CV LOWER VASCULAR LEFT SAPHENOFEMORAL JUNCTION COMPRESS: NORMAL
BH CV LOWER VASCULAR LEFT SAPHENOFEMORAL JUNCTION PHASIC: NORMAL
BH CV LOWER VASCULAR LEFT SAPHENOFEMORAL JUNCTION SPONT: NORMAL
BH CV LOWER VASCULAR RIGHT COMMON FEMORAL AUGMENT: NORMAL
BH CV LOWER VASCULAR RIGHT COMMON FEMORAL COMPETENT: NORMAL
BH CV LOWER VASCULAR RIGHT COMMON FEMORAL COMPRESS: NORMAL
BH CV LOWER VASCULAR RIGHT COMMON FEMORAL PHASIC: NORMAL
BH CV LOWER VASCULAR RIGHT COMMON FEMORAL SPONT: NORMAL
BH CV LOWER VASCULAR RIGHT DISTAL FEMORAL COMPRESS: NORMAL
BH CV LOWER VASCULAR RIGHT GASTRONEMIUS COMPRESS: NORMAL
BH CV LOWER VASCULAR RIGHT GASTRONEMIUS THROMBUS: NORMAL
BH CV LOWER VASCULAR RIGHT GREATER SAPH AK COMPRESS: NORMAL
BH CV LOWER VASCULAR RIGHT GREATER SAPH BK COMPRESS: NORMAL
BH CV LOWER VASCULAR RIGHT LESSER SAPH COMPRESS: NORMAL
BH CV LOWER VASCULAR RIGHT MID FEMORAL AUGMENT: NORMAL
BH CV LOWER VASCULAR RIGHT MID FEMORAL COMPETENT: NORMAL
BH CV LOWER VASCULAR RIGHT MID FEMORAL COMPRESS: NORMAL
BH CV LOWER VASCULAR RIGHT MID FEMORAL PHASIC: NORMAL
BH CV LOWER VASCULAR RIGHT MID FEMORAL SPONT: NORMAL
BH CV LOWER VASCULAR RIGHT PERONEAL COMPRESS: NORMAL
BH CV LOWER VASCULAR RIGHT POPLITEAL AUGMENT: NORMAL
BH CV LOWER VASCULAR RIGHT POPLITEAL COMPETENT: NORMAL
BH CV LOWER VASCULAR RIGHT POPLITEAL COMPRESS: NORMAL
BH CV LOWER VASCULAR RIGHT POPLITEAL PHASIC: NORMAL
BH CV LOWER VASCULAR RIGHT POPLITEAL SPONT: NORMAL
BH CV LOWER VASCULAR RIGHT POSTERIOR TIBIAL COMPRESS: NORMAL
BH CV LOWER VASCULAR RIGHT PROXIMAL FEMORAL COMPRESS: NORMAL
BH CV LOWER VASCULAR RIGHT SAPHENOFEMORAL JUNCTION COMPRESS: NORMAL
BH CV LOWER VASCULAR RIGHT SAPHENOFEMORAL JUNCTION PHASIC: NORMAL
BH CV LOWER VASCULAR RIGHT SAPHENOFEMORAL JUNCTION SPONT: NORMAL
BILIRUB UR QL STRIP: NEGATIVE
BILIRUB UR QL STRIP: NEGATIVE
BUN BLD-MCNC: 25 MG/DL (ref 8–23)
BUN/CREAT SERPL: 15.3 (ref 7–25)
CALCIUM SPEC-SCNC: 8.6 MG/DL (ref 8.6–10.5)
CHLORIDE SERPL-SCNC: 105 MMOL/L (ref 98–107)
CLARITY UR: CLEAR
CLARITY UR: CLEAR
CO2 SERPL-SCNC: 26.3 MMOL/L (ref 22–29)
COLOR UR: YELLOW
COLOR UR: YELLOW
CREAT BLD-MCNC: 1.63 MG/DL (ref 0.76–1.27)
DEPRECATED RDW RBC AUTO: 51.4 FL (ref 37–54)
ERYTHROCYTE [DISTWIDTH] IN BLOOD BY AUTOMATED COUNT: 15 % (ref 12.3–15.4)
GFR SERPL CREATININE-BSD FRML MDRD: 42 ML/MIN/1.73
GLUCOSE BLD-MCNC: 111 MG/DL (ref 65–99)
GLUCOSE BLDC GLUCOMTR-MCNC: 107 MG/DL (ref 70–130)
GLUCOSE BLDC GLUCOMTR-MCNC: 138 MG/DL (ref 70–130)
GLUCOSE BLDC GLUCOMTR-MCNC: 144 MG/DL (ref 70–130)
GLUCOSE BLDC GLUCOMTR-MCNC: 185 MG/DL (ref 70–130)
GLUCOSE BLDC GLUCOMTR-MCNC: 214 MG/DL (ref 70–130)
GLUCOSE UR STRIP-MCNC: NEGATIVE MG/DL
GLUCOSE UR STRIP-MCNC: NEGATIVE MG/DL
HCT VFR BLD AUTO: 27.9 % (ref 37.5–51)
HGB BLD-MCNC: 8.5 G/DL (ref 13–17.7)
HGB UR QL STRIP.AUTO: ABNORMAL
HGB UR QL STRIP.AUTO: ABNORMAL
HYALINE CASTS UR QL AUTO: ABNORMAL /LPF
KETONES UR QL STRIP: NEGATIVE
KETONES UR QL STRIP: NEGATIVE
LEUKOCYTE ESTERASE UR QL STRIP.AUTO: ABNORMAL
LEUKOCYTE ESTERASE UR QL STRIP.AUTO: ABNORMAL
MCH RBC QN AUTO: 28.7 PG (ref 26.6–33)
MCHC RBC AUTO-ENTMCNC: 30.5 G/DL (ref 31.5–35.7)
MCV RBC AUTO: 94.3 FL (ref 79–97)
NITRITE UR QL STRIP: NEGATIVE
NITRITE UR QL STRIP: NEGATIVE
PH UR STRIP.AUTO: <=5 [PH] (ref 5–8)
PH UR STRIP.AUTO: <=5 [PH] (ref 5–8)
PLATELET # BLD AUTO: 98 10*3/MM3 (ref 140–450)
PMV BLD AUTO: 11.9 FL (ref 6–12)
POTASSIUM BLD-SCNC: 4.2 MMOL/L (ref 3.5–5.2)
PROT UR QL STRIP: ABNORMAL
PROT UR QL STRIP: ABNORMAL
RBC # BLD AUTO: 2.96 10*6/MM3 (ref 4.14–5.8)
RBC # UR: ABNORMAL /HPF
REF LAB TEST METHOD: ABNORMAL
SODIUM BLD-SCNC: 143 MMOL/L (ref 136–145)
SP GR UR STRIP: 1.02 (ref 1–1.03)
SP GR UR STRIP: 1.02 (ref 1–1.03)
SQUAMOUS #/AREA URNS HPF: ABNORMAL /HPF
TROPONIN T SERPL-MCNC: 0.01 NG/ML (ref 0–0.03)
TROPONIN T SERPL-MCNC: <0.01 NG/ML (ref 0–0.03)
UROBILINOGEN UR QL STRIP: ABNORMAL
UROBILINOGEN UR QL STRIP: ABNORMAL
WBC NRBC COR # BLD: 7.93 10*3/MM3 (ref 3.4–10.8)
WBC UR QL AUTO: ABNORMAL /HPF

## 2019-05-20 PROCEDURE — 99214 OFFICE O/P EST MOD 30 MIN: CPT | Performed by: INTERNAL MEDICINE

## 2019-05-20 PROCEDURE — 87077 CULTURE AEROBIC IDENTIFY: CPT | Performed by: HOSPITALIST

## 2019-05-20 PROCEDURE — G0378 HOSPITAL OBSERVATION PER HR: HCPCS

## 2019-05-20 PROCEDURE — 84484 ASSAY OF TROPONIN QUANT: CPT | Performed by: HOSPITALIST

## 2019-05-20 PROCEDURE — 87186 SC STD MICRODIL/AGAR DIL: CPT | Performed by: HOSPITALIST

## 2019-05-20 PROCEDURE — 80048 BASIC METABOLIC PNL TOTAL CA: CPT | Performed by: HOSPITALIST

## 2019-05-20 PROCEDURE — 93970 EXTREMITY STUDY: CPT

## 2019-05-20 PROCEDURE — 99213 OFFICE O/P EST LOW 20 MIN: CPT | Performed by: PSYCHIATRY & NEUROLOGY

## 2019-05-20 PROCEDURE — 82962 GLUCOSE BLOOD TEST: CPT

## 2019-05-20 PROCEDURE — 81001 URINALYSIS AUTO W/SCOPE: CPT | Performed by: EMERGENCY MEDICINE

## 2019-05-20 PROCEDURE — 87086 URINE CULTURE/COLONY COUNT: CPT | Performed by: HOSPITALIST

## 2019-05-20 PROCEDURE — 85027 COMPLETE CBC AUTOMATED: CPT | Performed by: HOSPITALIST

## 2019-05-20 PROCEDURE — 63710000001 INSULIN LISPRO (HUMAN) PER 5 UNITS: Performed by: HOSPITALIST

## 2019-05-20 PROCEDURE — 92610 EVALUATE SWALLOWING FUNCTION: CPT

## 2019-05-20 RX ORDER — NITROGLYCERIN 0.4 MG/1
0.4 TABLET SUBLINGUAL
Status: DISCONTINUED | OUTPATIENT
Start: 2019-05-20 | End: 2019-05-23 | Stop reason: HOSPADM

## 2019-05-20 RX ORDER — ARIPIPRAZOLE 5 MG/1
5 TABLET ORAL
Status: DISCONTINUED | OUTPATIENT
Start: 2019-05-20 | End: 2019-05-21

## 2019-05-20 RX ADMIN — DIVALPROEX SODIUM 125 MG: 125 CAPSULE, COATED PELLETS ORAL at 11:08

## 2019-05-20 RX ADMIN — INSULIN LISPRO 2 UNITS: 100 INJECTION, SOLUTION INTRAVENOUS; SUBCUTANEOUS at 21:32

## 2019-05-20 RX ADMIN — CARVEDILOL 25 MG: 25 TABLET, FILM COATED ORAL at 11:08

## 2019-05-20 RX ADMIN — CARVEDILOL 25 MG: 25 TABLET, FILM COATED ORAL at 17:10

## 2019-05-20 RX ADMIN — DEXTROSE AND SODIUM CHLORIDE 75 ML/HR: 5; 900 INJECTION, SOLUTION INTRAVENOUS at 00:05

## 2019-05-20 RX ADMIN — INSULIN LISPRO 3 UNITS: 100 INJECTION, SOLUTION INTRAVENOUS; SUBCUTANEOUS at 17:10

## 2019-05-20 RX ADMIN — DIVALPROEX SODIUM 125 MG: 125 CAPSULE, COATED PELLETS ORAL at 21:32

## 2019-05-20 RX ADMIN — DEXTROSE AND SODIUM CHLORIDE 50 ML/HR: 5; 900 INJECTION, SOLUTION INTRAVENOUS at 13:53

## 2019-05-20 RX ADMIN — APIXABAN 5 MG: 5 TABLET, FILM COATED ORAL at 21:32

## 2019-05-20 RX ADMIN — APIXABAN 5 MG: 5 TABLET, FILM COATED ORAL at 11:08

## 2019-05-20 RX ADMIN — ARIPIPRAZOLE 5 MG: 5 TABLET ORAL at 17:10

## 2019-05-20 RX ADMIN — SODIUM CHLORIDE, PRESERVATIVE FREE 3 ML: 5 INJECTION INTRAVENOUS at 11:08

## 2019-05-20 RX ADMIN — SODIUM CHLORIDE, PRESERVATIVE FREE 3 ML: 5 INJECTION INTRAVENOUS at 21:36

## 2019-05-21 LAB
ANION GAP SERPL CALCULATED.3IONS-SCNC: 10.1 MMOL/L
BUN BLD-MCNC: 14 MG/DL (ref 8–23)
BUN/CREAT SERPL: 12.7 (ref 7–25)
CALCIUM SPEC-SCNC: 8.9 MG/DL (ref 8.6–10.5)
CHLORIDE SERPL-SCNC: 102 MMOL/L (ref 98–107)
CO2 SERPL-SCNC: 24.9 MMOL/L (ref 22–29)
CREAT BLD-MCNC: 1.1 MG/DL (ref 0.76–1.27)
GFR SERPL CREATININE-BSD FRML MDRD: 67 ML/MIN/1.73
GLUCOSE BLD-MCNC: 135 MG/DL (ref 65–99)
GLUCOSE BLDC GLUCOMTR-MCNC: 103 MG/DL (ref 70–130)
GLUCOSE BLDC GLUCOMTR-MCNC: 114 MG/DL (ref 70–130)
GLUCOSE BLDC GLUCOMTR-MCNC: 154 MG/DL (ref 70–130)
GLUCOSE BLDC GLUCOMTR-MCNC: 206 MG/DL (ref 70–130)
POTASSIUM BLD-SCNC: 4.5 MMOL/L (ref 3.5–5.2)
SODIUM BLD-SCNC: 137 MMOL/L (ref 136–145)

## 2019-05-21 PROCEDURE — 80048 BASIC METABOLIC PNL TOTAL CA: CPT | Performed by: HOSPITALIST

## 2019-05-21 PROCEDURE — 25010000002 VANCOMYCIN 10 G RECONSTITUTED SOLUTION: Performed by: HOSPITALIST

## 2019-05-21 PROCEDURE — 25010000002 THIAMINE PER 100 MG: Performed by: PSYCHIATRY & NEUROLOGY

## 2019-05-21 PROCEDURE — 82962 GLUCOSE BLOOD TEST: CPT

## 2019-05-21 PROCEDURE — 63710000001 INSULIN LISPRO (HUMAN) PER 5 UNITS: Performed by: HOSPITALIST

## 2019-05-21 RX ORDER — CHLORDIAZEPOXIDE HYDROCHLORIDE 10 MG/1
10 CAPSULE, GELATIN COATED ORAL 2 TIMES DAILY
Status: DISCONTINUED | OUTPATIENT
Start: 2019-05-21 | End: 2019-05-23

## 2019-05-21 RX ADMIN — CARVEDILOL 25 MG: 25 TABLET, FILM COATED ORAL at 17:39

## 2019-05-21 RX ADMIN — CARVEDILOL 25 MG: 25 TABLET, FILM COATED ORAL at 08:46

## 2019-05-21 RX ADMIN — INSULIN LISPRO 3 UNITS: 100 INJECTION, SOLUTION INTRAVENOUS; SUBCUTANEOUS at 12:38

## 2019-05-21 RX ADMIN — CHLORDIAZEPOXIDE HYDROCHLORIDE 10 MG: 10 CAPSULE ORAL at 15:12

## 2019-05-21 RX ADMIN — APIXABAN 5 MG: 5 TABLET, FILM COATED ORAL at 08:46

## 2019-05-21 RX ADMIN — APIXABAN 5 MG: 5 TABLET, FILM COATED ORAL at 21:12

## 2019-05-21 RX ADMIN — VANCOMYCIN HYDROCHLORIDE 2250 MG: 10 INJECTION, POWDER, LYOPHILIZED, FOR SOLUTION INTRAVENOUS at 10:43

## 2019-05-21 RX ADMIN — CHLORDIAZEPOXIDE HYDROCHLORIDE 10 MG: 10 CAPSULE ORAL at 21:12

## 2019-05-21 RX ADMIN — SODIUM CHLORIDE, PRESERVATIVE FREE 3 ML: 5 INJECTION INTRAVENOUS at 21:15

## 2019-05-21 RX ADMIN — THIAMINE HYDROCHLORIDE 100 MG: 100 INJECTION, SOLUTION INTRAMUSCULAR; INTRAVENOUS at 15:12

## 2019-05-21 RX ADMIN — DEXTROSE AND SODIUM CHLORIDE 50 ML/HR: 5; 900 INJECTION, SOLUTION INTRAVENOUS at 08:48

## 2019-05-21 RX ADMIN — SODIUM CHLORIDE, PRESERVATIVE FREE 3 ML: 5 INJECTION INTRAVENOUS at 08:47

## 2019-05-21 RX ADMIN — DIVALPROEX SODIUM 125 MG: 125 CAPSULE, COATED PELLETS ORAL at 08:46

## 2019-05-21 RX ADMIN — INSULIN LISPRO 2 UNITS: 100 INJECTION, SOLUTION INTRAVENOUS; SUBCUTANEOUS at 08:46

## 2019-05-22 PROBLEM — A41.9 SEPSIS SECONDARY TO UTI: Status: ACTIVE | Noted: 2019-05-22

## 2019-05-22 PROBLEM — G93.41 METABOLIC ENCEPHALOPATHY: Status: ACTIVE | Noted: 2019-05-19

## 2019-05-22 PROBLEM — N18.2 CKD (CHRONIC KIDNEY DISEASE) STAGE 2, GFR 60-89 ML/MIN: Status: ACTIVE | Noted: 2019-05-22

## 2019-05-22 PROBLEM — N39.0 SEPSIS SECONDARY TO UTI: Status: ACTIVE | Noted: 2019-05-22

## 2019-05-22 LAB
ANION GAP SERPL CALCULATED.3IONS-SCNC: 9.8 MMOL/L
BACTERIA SPEC AEROBE CULT: ABNORMAL
BUN BLD-MCNC: 11 MG/DL (ref 8–23)
BUN/CREAT SERPL: 10.2 (ref 7–25)
CALCIUM SPEC-SCNC: 8.8 MG/DL (ref 8.6–10.5)
CHLORIDE SERPL-SCNC: 102 MMOL/L (ref 98–107)
CO2 SERPL-SCNC: 26.2 MMOL/L (ref 22–29)
CREAT BLD-MCNC: 1.08 MG/DL (ref 0.76–1.27)
DEPRECATED RDW RBC AUTO: 50.6 FL (ref 37–54)
ERYTHROCYTE [DISTWIDTH] IN BLOOD BY AUTOMATED COUNT: 14.6 % (ref 12.3–15.4)
GFR SERPL CREATININE-BSD FRML MDRD: 68 ML/MIN/1.73
GLUCOSE BLD-MCNC: 116 MG/DL (ref 65–99)
GLUCOSE BLDC GLUCOMTR-MCNC: 113 MG/DL (ref 70–130)
GLUCOSE BLDC GLUCOMTR-MCNC: 127 MG/DL (ref 70–130)
GLUCOSE BLDC GLUCOMTR-MCNC: 141 MG/DL (ref 70–130)
GLUCOSE BLDC GLUCOMTR-MCNC: 145 MG/DL (ref 70–130)
HCT VFR BLD AUTO: 27.4 % (ref 37.5–51)
HGB BLD-MCNC: 8.4 G/DL (ref 13–17.7)
MCH RBC QN AUTO: 28.9 PG (ref 26.6–33)
MCHC RBC AUTO-ENTMCNC: 30.7 G/DL (ref 31.5–35.7)
MCV RBC AUTO: 94.2 FL (ref 79–97)
PLATELET # BLD AUTO: 122 10*3/MM3 (ref 140–450)
PMV BLD AUTO: 12.3 FL (ref 6–12)
POTASSIUM BLD-SCNC: 4.4 MMOL/L (ref 3.5–5.2)
RBC # BLD AUTO: 2.91 10*6/MM3 (ref 4.14–5.8)
SODIUM BLD-SCNC: 138 MMOL/L (ref 136–145)
WBC NRBC COR # BLD: 5.25 10*3/MM3 (ref 3.4–10.8)

## 2019-05-22 PROCEDURE — 25010000002 VANCOMYCIN: Performed by: HOSPITALIST

## 2019-05-22 PROCEDURE — 25010000002 VANCOMYCIN 10 G RECONSTITUTED SOLUTION: Performed by: HOSPITALIST

## 2019-05-22 PROCEDURE — 82962 GLUCOSE BLOOD TEST: CPT

## 2019-05-22 PROCEDURE — 85027 COMPLETE CBC AUTOMATED: CPT | Performed by: HOSPITALIST

## 2019-05-22 PROCEDURE — 80048 BASIC METABOLIC PNL TOTAL CA: CPT | Performed by: HOSPITALIST

## 2019-05-22 RX ORDER — AMPICILLIN 500 MG/1
500 CAPSULE ORAL EVERY 6 HOURS SCHEDULED
Status: DISCONTINUED | OUTPATIENT
Start: 2019-05-22 | End: 2019-05-23 | Stop reason: HOSPADM

## 2019-05-22 RX ADMIN — APIXABAN 5 MG: 5 TABLET, FILM COATED ORAL at 10:40

## 2019-05-22 RX ADMIN — CHLORDIAZEPOXIDE HYDROCHLORIDE 10 MG: 10 CAPSULE ORAL at 22:37

## 2019-05-22 RX ADMIN — VANCOMYCIN HYDROCHLORIDE 1500 MG: 10 INJECTION, POWDER, LYOPHILIZED, FOR SOLUTION INTRAVENOUS at 00:45

## 2019-05-22 RX ADMIN — SODIUM CHLORIDE, PRESERVATIVE FREE 3 ML: 5 INJECTION INTRAVENOUS at 22:38

## 2019-05-22 RX ADMIN — CARVEDILOL 25 MG: 25 TABLET, FILM COATED ORAL at 18:42

## 2019-05-22 RX ADMIN — SODIUM CHLORIDE, PRESERVATIVE FREE 3 ML: 5 INJECTION INTRAVENOUS at 10:40

## 2019-05-22 RX ADMIN — CHLORDIAZEPOXIDE HYDROCHLORIDE 10 MG: 10 CAPSULE ORAL at 10:40

## 2019-05-22 RX ADMIN — APIXABAN 5 MG: 5 TABLET, FILM COATED ORAL at 22:37

## 2019-05-22 RX ADMIN — CARVEDILOL 25 MG: 25 TABLET, FILM COATED ORAL at 10:40

## 2019-05-22 RX ADMIN — AMPICILLIN 500 MG: 500 CAPSULE ORAL at 18:43

## 2019-05-22 RX ADMIN — VANCOMYCIN HYDROCHLORIDE 1500 MG: 10 INJECTION, POWDER, LYOPHILIZED, FOR SOLUTION INTRAVENOUS at 10:40

## 2019-05-23 VITALS
DIASTOLIC BLOOD PRESSURE: 83 MMHG | WEIGHT: 248 LBS | TEMPERATURE: 98.5 F | RESPIRATION RATE: 16 BRPM | OXYGEN SATURATION: 96 % | HEART RATE: 77 BPM | BODY MASS INDEX: 28.69 KG/M2 | HEIGHT: 78 IN | SYSTOLIC BLOOD PRESSURE: 131 MMHG

## 2019-05-23 LAB
GLUCOSE BLDC GLUCOMTR-MCNC: 108 MG/DL (ref 70–130)
GLUCOSE BLDC GLUCOMTR-MCNC: 238 MG/DL (ref 70–130)

## 2019-05-23 PROCEDURE — 82962 GLUCOSE BLOOD TEST: CPT

## 2019-05-23 RX ORDER — AMPICILLIN 500 MG/1
500 CAPSULE ORAL EVERY 6 HOURS SCHEDULED
Qty: 25 CAPSULE | Refills: 0
Start: 2019-05-23 | End: 2019-05-30

## 2019-05-23 RX ORDER — CHLORDIAZEPOXIDE HYDROCHLORIDE 5 MG/1
5 CAPSULE, GELATIN COATED ORAL 2 TIMES DAILY
Qty: 6 CAPSULE | Refills: 0 | Status: SHIPPED | OUTPATIENT
Start: 2019-05-23 | End: 2019-05-26

## 2019-05-23 RX ADMIN — AMPICILLIN 500 MG: 500 CAPSULE ORAL at 00:48

## 2019-05-23 RX ADMIN — AMPICILLIN 500 MG: 500 CAPSULE ORAL at 12:27

## 2019-05-23 RX ADMIN — AMPICILLIN 500 MG: 500 CAPSULE ORAL at 06:05

## 2019-05-23 RX ADMIN — CARVEDILOL 25 MG: 25 TABLET, FILM COATED ORAL at 08:58

## 2019-05-23 RX ADMIN — APIXABAN 5 MG: 5 TABLET, FILM COATED ORAL at 08:58

## 2019-05-23 RX ADMIN — ACETAMINOPHEN 500 MG: 500 TABLET, FILM COATED ORAL at 03:40

## 2019-05-23 RX ADMIN — CHLORDIAZEPOXIDE HYDROCHLORIDE 10 MG: 10 CAPSULE ORAL at 08:58

## 2019-05-23 RX ADMIN — SODIUM CHLORIDE, PRESERVATIVE FREE 3 ML: 5 INJECTION INTRAVENOUS at 08:59

## 2019-06-11 ENCOUNTER — TELEPHONE (OUTPATIENT)
Dept: ORTHOPEDIC SURGERY | Facility: CLINIC | Age: 69
End: 2019-06-11

## 2019-06-11 NOTE — TELEPHONE ENCOUNTER
Called Joan and informed [facility] and [facility] recommend patient see Dr. Jeffrey. Gave her the office information.

## 2023-08-14 ENCOUNTER — HOSPITAL ENCOUNTER (INPATIENT)
Facility: HOSPITAL | Age: 73
LOS: 4 days | Discharge: NURSING, REHAB OR LONG-TERM CARE | DRG: 177 | End: 2023-08-18
Attending: EMERGENCY MEDICINE | Admitting: HOSPITALIST
Payer: MEDICARE

## 2023-08-14 ENCOUNTER — APPOINTMENT (OUTPATIENT)
Dept: GENERAL RADIOLOGY | Facility: HOSPITAL | Age: 73
DRG: 177 | End: 2023-08-14
Payer: MEDICARE

## 2023-08-14 ENCOUNTER — APPOINTMENT (OUTPATIENT)
Dept: CT IMAGING | Facility: HOSPITAL | Age: 73
DRG: 177 | End: 2023-08-14
Payer: MEDICARE

## 2023-08-14 DIAGNOSIS — J96.01 ACUTE RESPIRATORY FAILURE WITH HYPOXIA: ICD-10-CM

## 2023-08-14 DIAGNOSIS — J69.0 ASPIRATION PNEUMONIA OF BOTH LUNGS, UNSPECIFIED ASPIRATION PNEUMONIA TYPE, UNSPECIFIED PART OF LUNG: ICD-10-CM

## 2023-08-14 DIAGNOSIS — R55 SYNCOPE AND COLLAPSE: Primary | ICD-10-CM

## 2023-08-14 DIAGNOSIS — Z86.73 HISTORY OF STROKE: ICD-10-CM

## 2023-08-14 DIAGNOSIS — Z86.79 HISTORY OF ATRIAL FIBRILLATION: ICD-10-CM

## 2023-08-14 LAB
ALBUMIN SERPL-MCNC: 4.1 G/DL (ref 3.5–5.2)
ALBUMIN/GLOB SERPL: 1.2 G/DL
ALP SERPL-CCNC: 80 U/L (ref 39–117)
ALT SERPL W P-5'-P-CCNC: 9 U/L (ref 1–41)
ANION GAP SERPL CALCULATED.3IONS-SCNC: 8.6 MMOL/L (ref 5–15)
AST SERPL-CCNC: 9 U/L (ref 1–40)
BASOPHILS # BLD AUTO: 0.06 10*3/MM3 (ref 0–0.2)
BASOPHILS NFR BLD AUTO: 0.5 % (ref 0–1.5)
BILIRUB SERPL-MCNC: 0.8 MG/DL (ref 0–1.2)
BUN SERPL-MCNC: 21 MG/DL (ref 8–23)
BUN/CREAT SERPL: 12.8 (ref 7–25)
CALCIUM SPEC-SCNC: 9.9 MG/DL (ref 8.6–10.5)
CHLORIDE SERPL-SCNC: 101 MMOL/L (ref 98–107)
CO2 SERPL-SCNC: 27.4 MMOL/L (ref 22–29)
CREAT SERPL-MCNC: 1.64 MG/DL (ref 0.76–1.27)
D-LACTATE SERPL-SCNC: 1.8 MMOL/L (ref 0.5–2)
DEPRECATED RDW RBC AUTO: 45.2 FL (ref 37–54)
EGFRCR SERPLBLD CKD-EPI 2021: 44.2 ML/MIN/1.73
EOSINOPHIL # BLD AUTO: 0.35 10*3/MM3 (ref 0–0.4)
EOSINOPHIL NFR BLD AUTO: 2.8 % (ref 0.3–6.2)
ERYTHROCYTE [DISTWIDTH] IN BLOOD BY AUTOMATED COUNT: 13.7 % (ref 12.3–15.4)
GEN 5 2HR TROPONIN T REFLEX: 21 NG/L
GLOBULIN UR ELPH-MCNC: 3.5 GM/DL
GLUCOSE SERPL-MCNC: 217 MG/DL (ref 65–99)
HCT VFR BLD AUTO: 39 % (ref 37.5–51)
HGB BLD-MCNC: 12.6 G/DL (ref 13–17.7)
INR PPP: 1.51 (ref 0.9–1.1)
LYMPHOCYTES # BLD AUTO: 1.13 10*3/MM3 (ref 0.7–3.1)
LYMPHOCYTES NFR BLD AUTO: 8.9 % (ref 19.6–45.3)
MAGNESIUM SERPL-MCNC: 2 MG/DL (ref 1.6–2.4)
MCH RBC QN AUTO: 29 PG (ref 26.6–33)
MCHC RBC AUTO-ENTMCNC: 32.3 G/DL (ref 31.5–35.7)
MCV RBC AUTO: 89.7 FL (ref 79–97)
MONOCYTES # BLD AUTO: 0.79 10*3/MM3 (ref 0.1–0.9)
MONOCYTES NFR BLD AUTO: 6.3 % (ref 5–12)
NEUTROPHILS NFR BLD AUTO: 10.16 10*3/MM3 (ref 1.7–7)
NEUTROPHILS NFR BLD AUTO: 80.3 % (ref 42.7–76)
PLATELET # BLD AUTO: 140 10*3/MM3 (ref 140–450)
PMV BLD AUTO: 11.6 FL (ref 6–12)
POTASSIUM SERPL-SCNC: 5 MMOL/L (ref 3.5–5.2)
PROCALCITONIN SERPL-MCNC: 0.07 NG/ML (ref 0–0.25)
PROT SERPL-MCNC: 7.6 G/DL (ref 6–8.5)
PROTHROMBIN TIME: 18.5 SECONDS (ref 11.7–14.2)
RBC # BLD AUTO: 4.35 10*6/MM3 (ref 4.14–5.8)
SODIUM SERPL-SCNC: 137 MMOL/L (ref 136–145)
T4 FREE SERPL-MCNC: 1.2 NG/DL (ref 0.93–1.7)
TROPONIN T DELTA: 1 NG/L
TROPONIN T SERPL HS-MCNC: 20 NG/L
TSH SERPL DL<=0.05 MIU/L-ACNC: 3.24 UIU/ML (ref 0.27–4.2)
WBC NRBC COR # BLD: 12.64 10*3/MM3 (ref 3.4–10.8)

## 2023-08-14 PROCEDURE — 84484 ASSAY OF TROPONIN QUANT: CPT | Performed by: EMERGENCY MEDICINE

## 2023-08-14 PROCEDURE — 83735 ASSAY OF MAGNESIUM: CPT | Performed by: EMERGENCY MEDICINE

## 2023-08-14 PROCEDURE — 85025 COMPLETE CBC W/AUTO DIFF WBC: CPT | Performed by: EMERGENCY MEDICINE

## 2023-08-14 PROCEDURE — 84439 ASSAY OF FREE THYROXINE: CPT | Performed by: EMERGENCY MEDICINE

## 2023-08-14 PROCEDURE — 84443 ASSAY THYROID STIM HORMONE: CPT | Performed by: EMERGENCY MEDICINE

## 2023-08-14 PROCEDURE — 94664 DEMO&/EVAL PT USE INHALER: CPT

## 2023-08-14 PROCEDURE — 84145 PROCALCITONIN (PCT): CPT | Performed by: EMERGENCY MEDICINE

## 2023-08-14 PROCEDURE — 70450 CT HEAD/BRAIN W/O DYE: CPT

## 2023-08-14 PROCEDURE — 80053 COMPREHEN METABOLIC PANEL: CPT | Performed by: EMERGENCY MEDICINE

## 2023-08-14 PROCEDURE — 25010000002 HYDRALAZINE PER 20 MG: Performed by: HOSPITALIST

## 2023-08-14 PROCEDURE — 36415 COLL VENOUS BLD VENIPUNCTURE: CPT

## 2023-08-14 PROCEDURE — 93005 ELECTROCARDIOGRAM TRACING: CPT | Performed by: EMERGENCY MEDICINE

## 2023-08-14 PROCEDURE — 99285 EMERGENCY DEPT VISIT HI MDM: CPT

## 2023-08-14 PROCEDURE — 85610 PROTHROMBIN TIME: CPT | Performed by: EMERGENCY MEDICINE

## 2023-08-14 PROCEDURE — 25010000002 PIPERACILLIN SOD-TAZOBACTAM PER 1 G: Performed by: EMERGENCY MEDICINE

## 2023-08-14 PROCEDURE — 94799 UNLISTED PULMONARY SVC/PX: CPT

## 2023-08-14 PROCEDURE — 25010000002 PIPERACILLIN SOD-TAZOBACTAM PER 1 G: Performed by: HOSPITALIST

## 2023-08-14 PROCEDURE — 71250 CT THORAX DX C-: CPT

## 2023-08-14 PROCEDURE — 94761 N-INVAS EAR/PLS OXIMETRY MLT: CPT

## 2023-08-14 PROCEDURE — 94640 AIRWAY INHALATION TREATMENT: CPT

## 2023-08-14 PROCEDURE — 87040 BLOOD CULTURE FOR BACTERIA: CPT | Performed by: EMERGENCY MEDICINE

## 2023-08-14 PROCEDURE — 83605 ASSAY OF LACTIC ACID: CPT | Performed by: EMERGENCY MEDICINE

## 2023-08-14 PROCEDURE — 71045 X-RAY EXAM CHEST 1 VIEW: CPT

## 2023-08-14 PROCEDURE — 74176 CT ABD & PELVIS W/O CONTRAST: CPT

## 2023-08-14 PROCEDURE — 93010 ELECTROCARDIOGRAM REPORT: CPT | Performed by: INTERNAL MEDICINE

## 2023-08-14 RX ORDER — SODIUM CHLORIDE 9 MG/ML
50 INJECTION, SOLUTION INTRAVENOUS CONTINUOUS
Status: DISCONTINUED | OUTPATIENT
Start: 2023-08-14 | End: 2023-08-17

## 2023-08-14 RX ORDER — ACETAMINOPHEN 650 MG
1 TABLET, EXTENDED RELEASE ORAL 3 TIMES DAILY
COMMUNITY
End: 2023-08-18 | Stop reason: HOSPADM

## 2023-08-14 RX ORDER — ATORVASTATIN CALCIUM 10 MG/1
10 TABLET, FILM COATED ORAL NIGHTLY
COMMUNITY

## 2023-08-14 RX ORDER — ASPIRIN 81 MG/1
81 TABLET ORAL DAILY
COMMUNITY

## 2023-08-14 RX ORDER — AMMONIUM LACTATE 12 G/100G
1 LOTION TOPICAL 2 TIMES DAILY
COMMUNITY
End: 2023-08-18 | Stop reason: HOSPADM

## 2023-08-14 RX ORDER — GUAIFENESIN 600 MG/1
600 TABLET, EXTENDED RELEASE ORAL EVERY 12 HOURS SCHEDULED
Status: DISCONTINUED | OUTPATIENT
Start: 2023-08-14 | End: 2023-08-18 | Stop reason: HOSPADM

## 2023-08-14 RX ORDER — FAMOTIDINE 20 MG/1
20 TABLET, FILM COATED ORAL 2 TIMES DAILY
Status: DISCONTINUED | OUTPATIENT
Start: 2023-08-14 | End: 2023-08-18 | Stop reason: HOSPADM

## 2023-08-14 RX ORDER — CETIRIZINE HYDROCHLORIDE 10 MG/1
10 TABLET ORAL NIGHTLY
COMMUNITY

## 2023-08-14 RX ORDER — ACETAMINOPHEN 160 MG/5ML
650 SOLUTION ORAL EVERY 6 HOURS PRN
Status: DISCONTINUED | OUTPATIENT
Start: 2023-08-14 | End: 2023-08-18 | Stop reason: HOSPADM

## 2023-08-14 RX ORDER — SODIUM CHLORIDE 0.9 % (FLUSH) 0.9 %
10 SYRINGE (ML) INJECTION AS NEEDED
Status: DISCONTINUED | OUTPATIENT
Start: 2023-08-14 | End: 2023-08-18 | Stop reason: HOSPADM

## 2023-08-14 RX ORDER — IPRATROPIUM BROMIDE AND ALBUTEROL SULFATE 2.5; .5 MG/3ML; MG/3ML
3 SOLUTION RESPIRATORY (INHALATION) EVERY 4 HOURS PRN
Status: DISCONTINUED | OUTPATIENT
Start: 2023-08-14 | End: 2023-08-17

## 2023-08-14 RX ORDER — IPRATROPIUM BROMIDE AND ALBUTEROL SULFATE 2.5; .5 MG/3ML; MG/3ML
3 SOLUTION RESPIRATORY (INHALATION) ONCE
Status: COMPLETED | OUTPATIENT
Start: 2023-08-14 | End: 2023-08-14

## 2023-08-14 RX ORDER — HYDRALAZINE HYDROCHLORIDE 20 MG/ML
10 INJECTION INTRAMUSCULAR; INTRAVENOUS EVERY 4 HOURS PRN
Status: DISCONTINUED | OUTPATIENT
Start: 2023-08-14 | End: 2023-08-15

## 2023-08-14 RX ADMIN — IPRATROPIUM BROMIDE AND ALBUTEROL SULFATE 3 ML: .5; 3 SOLUTION RESPIRATORY (INHALATION) at 14:24

## 2023-08-14 RX ADMIN — SODIUM CHLORIDE 75 ML/HR: 9 INJECTION, SOLUTION INTRAVENOUS at 20:54

## 2023-08-14 RX ADMIN — PIPERACILLIN SODIUM AND TAZOBACTAM SODIUM 3.38 G: 3; .375 INJECTION, SOLUTION INTRAVENOUS at 22:16

## 2023-08-14 RX ADMIN — GUAIFENESIN 600 MG: 600 TABLET, EXTENDED RELEASE ORAL at 20:54

## 2023-08-14 RX ADMIN — HYDRALAZINE HYDROCHLORIDE 10 MG: 20 INJECTION, SOLUTION INTRAMUSCULAR; INTRAVENOUS at 22:17

## 2023-08-14 RX ADMIN — FAMOTIDINE 20 MG: 20 TABLET, FILM COATED ORAL at 20:54

## 2023-08-14 RX ADMIN — PIPERACILLIN SODIUM AND TAZOBACTAM SODIUM 3.38 G: 3; .375 INJECTION, SOLUTION INTRAVENOUS at 16:24

## 2023-08-14 NOTE — ED NOTES
Nursing report ED to floor  Efra Tam  73 y.o.  male    HPI (triage note):   Chief Complaint   Patient presents with    Syncope       Admitting doctor:   Bernard aPz MD    Admitting diagnosis:   The primary encounter diagnosis was Syncope and collapse. Diagnoses of Acute respiratory failure with hypoxia, Aspiration pneumonia of both lungs, unspecified aspiration pneumonia type, unspecified part of lung, History of stroke, and History of atrial fibrillation were also pertinent to this visit.    Code status:   Current Code Status       Date Active Code Status Order ID Comments User Context       Prior            Allergies:   Patient has no known allergies.    Past Medical History:  Past Medical History:   Diagnosis Date    Atrial fibrillation     Chronic systolic CHF (congestive heart failure)     Combined receptive and expressive 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     Gout     Hemiparesis     R sided    Hyperlipidemia     Hypertension     Stroke         Weight:       08/14/23  1422   Weight: 112 kg (246 lb 14.6 oz)       Most recent vitals:   Vitals:    08/14/23 1424 08/14/23 1501 08/14/23 1546 08/14/23 1616   BP:  162/97 168/100 (!) 171/103   BP Location:       Patient Position:       Pulse:  74 89 93   Resp: 18      Temp:       TempSrc:       SpO2: 96% 90% 95% 95%   Weight:       Height:           Active LDAs/IV Access:   Lines, Drains & Airways       Active LDAs       Name Placement date Placement time Site Days    Peripheral IV 08/14/23 1414 Anterior;Left Forearm 08/14/23  1414  Forearm  less than 1                    Labs (abnormal labs have a star):   Labs Reviewed   COMPREHENSIVE METABOLIC PANEL - Abnormal; Notable for the following components:       Result Value    Glucose 217 (*)     Creatinine 1.64 (*)     eGFR 44.2 (*)     All other components within normal limits    Narrative:     GFR Normal >60  Chronic Kidney Disease <60  Kidney Failure <15    The GFR formula is only valid for adults with stable renal  "function between ages 18 and 70.   PROTIME-INR - Abnormal; Notable for the following components:    Protime 18.5 (*)     INR 1.51 (*)     All other components within normal limits   TROPONIN - Abnormal; Notable for the following components:    HS Troponin T 20 (*)     All other components within normal limits    Narrative:     High Sensitive Troponin T Reference Range:  <10.0 ng/L- Negative Female for AMI  <15.0 ng/L- Negative Male for AMI  >=10 - Abnormal Female indicating possible myocardial injury.  >=15 - Abnormal Male indicating possible myocardial injury.   Clinicians would have to utilize clinical acumen, EKG, Troponin, and serial changes to determine if it is an Acute Myocardial Infarction or myocardial injury due to an underlying chronic condition.        CBC WITH AUTO DIFFERENTIAL - Abnormal; Notable for the following components:    WBC 12.64 (*)     Hemoglobin 12.6 (*)     Neutrophil % 80.3 (*)     Lymphocyte % 8.9 (*)     Neutrophils, Absolute 10.16 (*)     All other components within normal limits   LACTIC ACID, PLASMA - Normal   PROCALCITONIN - Normal    Narrative:     As a Marker for Sepsis (Non-Neonates):    1. <0.5 ng/mL represents a low risk of severe sepsis and/or septic shock.  2. >2 ng/mL represents a high risk of severe sepsis and/or septic shock.    As a Marker for Lower Respiratory Tract Infections that require antibiotic therapy:    PCT on Admission    Antibiotic Therapy       6-12 Hrs later    >0.5                Strongly Recommended  >0.25 - <0.5        Recommended   0.1 - 0.25          Discouraged              Remeasure/reassess PCT  <0.1                Strongly Discouraged     Remeasure/reassess PCT    As 28 day mortality risk marker: \"Change in Procalcitonin Result\" (>80% or <=80%) if Day 0 (or Day 1) and Day 4 values are available. Refer to http://www.[facility]-pct-calculator.com    Change in PCT <=80%  A decrease of PCT levels below or equal to 80% defines a positive change in PCT test " result representing a higher risk for 28-day all-cause mortality of patients diagnosed with severe sepsis for septic shock.    Change in PCT >80%  A decrease of PCT levels of more than 80% defines a negative change in PCT result representing a lower risk for 28-day all-cause mortality of patients diagnosed with severe sepsis or septic shock.      MAGNESIUM - Normal   TSH - Normal   T4, FREE - Normal    Narrative:     Results may be falsely increased if patient taking Biotin.     BLOOD CULTURE   BLOOD CULTURE   HIGH SENSITIVITIY TROPONIN T 2HR   CBC AND DIFFERENTIAL    Narrative:     The following orders were created for panel order CBC & Differential.  Procedure                               Abnormality         Status                     ---------                               -----------         ------                     CBC Auto Differential[[id number]]        Abnormal            Final result                 Please view results for these tests on the individual orders.       EKG:   ECG 12 Lead Syncope   Preliminary Result   HEART RATE= 78  bpm   RR Interval= 769  ms   AR Interval=   ms   P Horizontal Axis=   deg   P Front Axis=   deg   QRSD Interval= 89  ms   QT Interval= 399  ms   QRS Axis= -16  deg   T Wave Axis= 54  deg   - ABNORMAL ECG -   Atrial fibrillation   Borderline left axis deviation   Borderline low voltage, extremity leads   Electronically Signed By:    Date and Time of Study: 2023-08-14 14:24:43          Meds given in ED:   Medications   sodium chloride 0.9 % flush 10 mL (has no administration in time range)   ipratropium-albuterol (DUO-NEB) nebulizer solution 3 mL (3 mL Nebulization Given 8/14/23 1424)   piperacillin-tazobactam (ZOSYN) 3.375 g in iso-osmotic dextrose 50 ml (premix) (3.375 g Intravenous New Bag 8/14/23 1624)       Imaging results:  CT Abdomen Pelvis Without Contrast    Result Date: 8/14/2023  1. Pulmonary groundglass opacities, likely infectious or inflammatory 2. Incidental  findings as above.  This report was finalized on 8/14/2023 3:54 PM by Dr. Jose Daniel Espinoza M.D.      CT Head Without Contrast    Result Date: 8/14/2023  A remote left PCA distribution infarct is appreciated. Paranasal sinus disease is noted as described above. There is no evidence of acute infarction or of intracranial hemorrhage. Further evaluation could be performed with an MRI examination of the brain as indicated.    Radiation dose reduction techniques were utilized, including automated exposure control and exposure modulation based on body size.  This report was finalized on 8/14/2023 4:30 PM by Dr. Davian Chowdhury M.D.      CT Chest Without Contrast Diagnostic    Result Date: 8/14/2023  1. Pulmonary groundglass opacities, likely infectious or inflammatory 2. Incidental findings as above.  This report was finalized on 8/14/2023 3:54 PM by Dr. Jose Daniel Espinoza M.D.      XR Chest 1 View    Result Date: 8/14/2023  No focal pulmonary consolidation. Mild cardiomegaly and pulmonary vascular congestion. Tortuous aorta.  This report was finalized on 8/14/2023 2:55 PM by Dr. Jay Quintero M.D.       Ambulatory status:   - assist x2    Social issues:   Social History     Socioeconomic History    Marital status:    Tobacco Use    Smoking status: Never    Smokeless tobacco: Never   Substance and Sexual Activity    Alcohol use: No    Drug use: No    Sexual activity: Never          NIH Stroke Scale:         Francisco Javier Barkley RN  08/14/23 16:48 EDT

## 2023-08-14 NOTE — ED NOTES
Pt arrives via EMS from [address] in [facility] for syncopal episode/ Upon arrival to ER, pt is on 15L. Pt was found at 80% on RA. Pt started vomiting shortly after he had the episode    This RN in appropriate PPE while in pt room. Pt wearing mask

## 2023-08-14 NOTE — ED PROVIDER NOTES
" EMERGENCY DEPARTMENT ENCOUNTER    Room Number:  36/36  Date seen:  8/14/2023  PCP: Suhas Bauman MD  Historian: EMS      HPI:  Chief Complaint: Syncope and shortness of breath    Context: Efra Tam is a 73 y.o. male who presents to the ED via EMS from the nursing home.  The patient has a history of stroke with right-sided hemiparesis and mild aphasia, A-fib on Eliquis and diabetes who reportedly was sitting in a shower chair when he had a syncopal episode that was witnessed by nursing home workers.  The patient was laid down and then vomited several times with concerns for aspiration and then began having trouble breathing with shortness of breath and \"gurgling\".  EMS states that on their arrival the patient had room air saturations of 80% and they placed him on 15 L.  They state he vomited once for them.  They state it was brown and there is no coffee-ground emesis.      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       PAST SURGICAL HISTORY  Past Surgical History:   Procedure Laterality Date    FRACTURE SURGERY           FAMILY HISTORY  History reviewed. No pertinent family " history.      SOCIAL HISTORY  Social History     Socioeconomic History    Marital status:    Tobacco Use    Smoking status: Never    Smokeless tobacco: Never   Substance and Sexual Activity    Alcohol use: No    Drug use: No    Sexual activity: Never         ALLERGIES  Patient has no known allergies.      PHYSICAL EXAM  ED Triage Vitals [08/14/23 1415]   Temp Heart Rate Resp BP SpO2   -- 77 22 (!) 183/113 100 %      Temp src Heart Rate Source Patient Position BP Location FiO2 (%)   -- -- Lying Right arm --       Physical Exam      GENERAL: 73-year-old chronically ill-appearing male in mild respiratory distress  HENT: NCAT: nares patent: Neck supple  EYES: no scleral icterus: Pale conjunctiva  CV: Irregularly irregular at 90  RESPIRATORY: Mild respiratory distress with rhonchi in all lung fields and upper airway noises  ABDOMEN: softly distended but nontender with diminished bowel sounds  MUSCULOSKELETAL: no deformity  NEURO: Awake with aphasia and right-sided weakness from prior stroke  PSYCH:  calm, cooperative  SKIN: warm, dry    Vital signs and nursing notes reviewed.      LAB RESULTS  Recent Results (from the past 24 hour(s))   Protime-INR    Collection Time: 08/14/23  2:21 PM    Specimen: Blood   Result Value Ref Range    Protime 18.5 (H) 11.7 - 14.2 Seconds    INR 1.51 (H) 0.90 - 1.10   Lactic Acid, Plasma    Collection Time: 08/14/23  2:21 PM    Specimen: Blood   Result Value Ref Range    Lactate 1.8 0.5 - 2.0 mmol/L   CBC Auto Differential    Collection Time: 08/14/23  2:21 PM    Specimen: Blood   Result Value Ref Range    WBC 12.64 (H) 3.40 - 10.80 10*3/mm3    RBC 4.35 4.14 - 5.80 10*6/mm3    Hemoglobin 12.6 (L) 13.0 - 17.7 g/dL    Hematocrit 39.0 37.5 - 51.0 %    MCV 89.7 79.0 - 97.0 fL    MCH 29.0 26.6 - 33.0 pg    MCHC 32.3 31.5 - 35.7 g/dL    RDW 13.7 12.3 - 15.4 %    RDW-SD 45.2 37.0 - 54.0 fl    MPV 11.6 6.0 - 12.0 fL    Platelets 140 140 - 450 10*3/mm3    Neutrophil % 80.3 (H) 42.7 - 76.0 %     Lymphocyte % 8.9 (L) 19.6 - 45.3 %    Monocyte % 6.3 5.0 - 12.0 %    Eosinophil % 2.8 0.3 - 6.2 %    Basophil % 0.5 0.0 - 1.5 %    Neutrophils, Absolute 10.16 (H) 1.70 - 7.00 10*3/mm3    Lymphocytes, Absolute 1.13 0.70 - 3.10 10*3/mm3    Monocytes, Absolute 0.79 0.10 - 0.90 10*3/mm3    Eosinophils, Absolute 0.35 0.00 - 0.40 10*3/mm3    Basophils, Absolute 0.06 0.00 - 0.20 10*3/mm3   ECG 12 Lead Syncope    Collection Time: 08/14/23  2:24 PM   Result Value Ref Range    QT Interval 399 ms   Comprehensive Metabolic Panel    Collection Time: 08/14/23  3:09 PM    Specimen: Blood   Result Value Ref Range    Glucose 217 (H) 65 - 99 mg/dL    BUN 21 8 - 23 mg/dL    Creatinine 1.64 (H) 0.76 - 1.27 mg/dL    Sodium 137 136 - 145 mmol/L    Potassium 5.0 3.5 - 5.2 mmol/L    Chloride 101 98 - 107 mmol/L    CO2 27.4 22.0 - 29.0 mmol/L    Calcium 9.9 8.6 - 10.5 mg/dL    Total Protein 7.6 6.0 - 8.5 g/dL    Albumin 4.1 3.5 - 5.2 g/dL    ALT (SGPT) 9 1 - 41 U/L    AST (SGOT) 9 1 - 40 U/L    Alkaline Phosphatase 80 39 - 117 U/L    Total Bilirubin 0.8 0.0 - 1.2 mg/dL    Globulin 3.5 gm/dL    A/G Ratio 1.2 g/dL    BUN/Creatinine Ratio 12.8 7.0 - 25.0    Anion Gap 8.6 5.0 - 15.0 mmol/L    eGFR 44.2 (L) >60.0 mL/min/1.73   High Sensitivity Troponin T    Collection Time: 08/14/23  3:09 PM    Specimen: Blood   Result Value Ref Range    HS Troponin T 20 (H) <15 ng/L   Procalcitonin    Collection Time: 08/14/23  3:09 PM    Specimen: Blood   Result Value Ref Range    Procalcitonin 0.07 0.00 - 0.25 ng/mL   Magnesium    Collection Time: 08/14/23  3:09 PM    Specimen: Blood   Result Value Ref Range    Magnesium 2.0 1.6 - 2.4 mg/dL   TSH    Collection Time: 08/14/23  3:09 PM    Specimen: Blood   Result Value Ref Range    TSH 3.240 0.270 - 4.200 uIU/mL   T4, Free    Collection Time: 08/14/23  3:09 PM    Specimen: Blood   Result Value Ref Range    Free T4 1.20 0.93 - 1.70 ng/dL       Ordered the above labs and reviewed the  results.        RADIOLOGY  CT Head Without Contrast    Result Date: 8/14/2023  CT HEAD WITHOUT CONTRAST  HISTORY: Nausea, vomiting.  COMPARISON: CT head 05/19/2019.  There is moderate atrophy. A remote infarct involving the medial aspect of the left temporal and occipital lobes is appreciated which was acute to subacute on the MRI examination of the brain performed on 04/21/2019. There is volume loss and secondary enlargement of the left temporal horn. There is no evidence of hemorrhage or of a focal area of decreased attenuation to suggest acute infarction. Moderate small vessel ischemic disease is noted.  There is mild-to-moderate mucosal thickening involving the right maxillary sinus and moderate-to-severe mucosal thickening and nearly complete opacification of the left maxillary sinus. Moderate mucosal thickening involving the ethmoid air cells is appreciated bilaterally.      A remote left PCA distribution infarct is appreciated. Paranasal sinus disease is noted as described above. There is no evidence of acute infarction or of intracranial hemorrhage. Further evaluation could be performed with an MRI examination of the brain as indicated.    Radiation dose reduction techniques were utilized, including automated exposure control and exposure modulation based on body size.  This report was finalized on 8/14/2023 4:30 PM by Dr. Davian Chowdhury M.D.      CT Chest Without Contrast Diagnostic, CT Abdomen Pelvis Without Contrast    Result Date: 8/14/2023  Examination: CT of the chest, abdomen, and pelvis without contrast  TECHNIQUE: CT of the chest, abdomen, and pelvis without intravenous contrast per protocol. Radiation dose reduction techniques were utilized, including automated exposure control and exposure modulation based on body size. The examination is limited by patient motion.  HISTORY:Nausea, vomiting, shortness of breath, and gurgling after vomiting. Stroke with right-sided hemiparesis  COMPARISON:None  available  FINDINGS:Chest:  There are bilateral diffusely distributed pulmonary groundglass nodular opacities. No pleural effusion or pneumothorax are seen. The central airways are patent.  No enlarged supraclavicular, axillary, or mediastinal lymph nodes are seen. The mediastinal vasculature is normal in caliber. There are coronary calcifications. Aortic valve calcifications are seen. The heart is normal in size, without pericardial effusion.  Abdomen and pelvis:   The liver, gallbladder, spleen, adrenal glands, kidneys, pancreas, stomach, small bowel, large bowel aside from diverticula, urinary bladder, and abdominal vasculature are normal. No intraperitoneal fluid collection or free gas are seen. No enlarged lymph nodes are demonstrated.  Bone windows demonstrate degenerative changes, without suspicious osseous lesion seen. There are bilateral L5-S1 pars defects with grade 1 anterolisthesis of L5 on S1      1. Pulmonary groundglass opacities, likely infectious or inflammatory 2. Incidental findings as above.  This report was finalized on 8/14/2023 3:54 PM by Dr. Jose Daniel Espinoza M.D.      XR Chest 1 View    Result Date: 8/14/2023  XR CHEST 1 VW-  HISTORY: Male who is 73 years-old,  short of breath  TECHNIQUE: Frontal view of the chest  COMPARISON: 05/19/2019  FINDINGS: The heart is mildly enlarged. Pulmonary vasculature is mildly congested. Aorta is tortuous. No focal pulmonary consolidation, pleural effusion, or pneumothorax. No acute osseous process.      No focal pulmonary consolidation. Mild cardiomegaly and pulmonary vascular congestion. Tortuous aorta.  This report was finalized on 8/14/2023 2:55 PM by Dr. Jay Quintero M.D.       Ordered the above noted radiological studies. Reviewed by me in PACS.            PROCEDURES  Procedures          MEDICATIONS GIVEN IN ER  Medications   sodium chloride 0.9 % flush 10 mL (has no administration in time range)   ipratropium-albuterol (DUO-NEB) nebulizer solution 3  mL (3 mL Nebulization Given 8/14/23 1424)   piperacillin-tazobactam (ZOSYN) 3.375 g in iso-osmotic dextrose 50 ml (premix) (3.375 g Intravenous New Bag 8/14/23 1624)             MEDICAL DECISION MAKING, PROGRESS, and CONSULTS    All labs have been independently reviewed by me.  All radiology studies have been reviewed by me and I have also reviewed the radiology report.   EKG's independently viewed and interpreted by me.  Discussion below represents my analysis of pertinent findings related to patient's condition, differential diagnosis, treatment plan and final disposition.      Additional sources:  - Discussed/ obtained information from independent historians: EMS states the patient's oxygen saturations were 80% on room air on arrival.    - External (non-ED) record review: I reviewed the patient's nursing home records which shows he has had a stroke with residual aphasia and right-sided weakness.  He also has A-fib and is on Eliquis.  He is a full code.    - Chronic or social conditions impacting care: Patient currently is residing in a nursing home    - Shared decision making: After shared decision-making discussion to myself and the patient we agree he needs to be admitted to the hospital for further evaluation and care      Orders placed during this visit:  Orders Placed This Encounter   Procedures    Blood Culture - Blood,    Blood Culture - Blood,    XR Chest 1 View    CT Head Without Contrast    CT Chest Without Contrast Diagnostic    CT Abdomen Pelvis Without Contrast    Comprehensive Metabolic Panel    Protime-INR    High Sensitivity Troponin T    Lactic Acid, Plasma    Procalcitonin    Magnesium    TSH    T4, Free    CBC Auto Differential    High Sensitivity Troponin T 2Hr    Monitor Blood Pressure    Pulse Oximetry, Continuous    Nasotracheal Suctioning (RT)    ECG 12 Lead Syncope    Insert Peripheral IV    Inpatient Admission    CBC & Differential         Differential diagnosis:  My differential  diagnosis includes but is not limited to pneumonia, congestive heart failure, pulmonary embolism, pleural effusion, acute coronary syndrome, chronic obstructive pulmonary disease exacerbation, or pneumothorax.      Independent interpretation of labs, radiology studies, and discussions with consultants:  ED Course as of 08/14/23 1637   Mon Aug 14, 2023   1423 We will give the patient a breathing treatment and perform NT suction and will wean his oxygen as tolerated to keep his sats in the 90s.  I will obtain labs, EKG and CT scans for further evaluation. [GP]   1430 EKG    EKG time: 1424  Rhythm/Rate: A-fib at 78  No Acute Ischemia  Non-Specific ST-T changes  No old EKG for comparison    Interpreted Contemporaneously by me.  Independently viewed by me     [GP]   1501 My independent interpretation of the patient's chest x-ray is cardiomegaly with chronic changes but no definitive infiltrate [GP]   1609 The patient's CT chest is consistent with bilateral pneumonia which likely could be aspiration pneumonitis.  Thus I will cover the patient with IV antibiotics.  The patient's abdominal CT scan is negative. [GP]   1618 On repeat evaluation the patient is more alert at this time.  He states he believes he had a stroke it was at [facility] 6 weeks ago.  He states he is on Eliquis and has a history of A-fib.  Currently have him on 4 L of oxygen with saturations of 95%.  I advised him of his need for admission to the hospital for syncopal episode, aspiration pneumonia and acute respiratory failure with hypoxia.  The patient understands and agrees with the plan. [GP]   1629 The patient's initial troponin is 20 and we will repeat it.  The patient is having no chest pain.  His EKG shows no ischemia. [GP]   1629 Patient's creatinine is mildly elevated at 1.64 I do not have an old 1 to compare.  His white count is 12 lactic acid is 1.8 and his procalcitonin is 0.07.  I am still awaiting the patient's head CT to be read from an hour  ago. [GP]   1630 The patient's head CT was just read as a old infarct but nothing acute.  I will admit him to a telemetry bed for further evaluation and care. [GP]   1635 I have discussed the case with Dr. Paz.  He is aware of the patient's syncope and collapse with subsequent vomiting and aspiration pneumonia and hypoxia.  He is aware the patient's had a prior stroke with right-sided paresis.  He will admit him to a telemetry bed for further evaluation and care. [GP]      ED Course User Index  [GP] Usman Naylor MD               DIAGNOSIS  Final diagnoses:   Syncope and collapse   Acute respiratory failure with hypoxia   Aspiration pneumonia of both lungs, unspecified aspiration pneumonia type, unspecified part of lung   History of stroke   History of atrial fibrillation         DISPOSITION  ADMISSION    Discussed treatment plan and reason for admission with pt/family and admitting physician.  Pt/family voiced understanding of the plan for admission for further testing/treatment as needed.            Latest Documented Vital Signs:  As of 16:37 EDT  BP- 168/100 HR- 89 Temp- 98.9 øF (37.2 øC) (Tympanic) O2 sat- 95%--on 4 L      --------------------  Please note that portions of this were completed with a voice recognition program.       Note Disclaimer: At [facility], we believe that sharing information builds trust and better relationships. You are receiving this note because you are receiving care at [facility] or recently visited. It is possible you will see health information before a provider has talked with you about it. This kind of information can be easy to misunderstand. To help you fully understand what it means for your health, we urge you to discuss this note with your provider.             Usman Naylor MD  08/14/23 2084

## 2023-08-14 NOTE — PROGRESS NOTES
Clinical Pharmacy Services: Medication History    Efra Tam is a 73 y.o. male presenting to [facility] for   Chief Complaint   Patient presents with    Syncope       He  has a past medical history of Atrial fibrillation, Chronic systolic CHF (congestive heart failure), Combined receptive and expressive 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, Gout, Hemiparesis, Hyperlipidemia, Hypertension, and Stroke.    Allergies as of 08/14/2023    (No Known Allergies)       Medication information was obtained from: [facility] Paperwork   Pharmacy and Phone Number:     Prior to Admission Medications       Prescriptions Last Dose Informant Patient Reported? Taking?    acetaminophen (TYLENOL) 325 MG tablet  Nursing Home Yes Yes    Take 2 tablets by mouth Every 4 (Four) Hours As Needed for Mild Pain.    allopurinol (ZYLOPRIM) 100 MG tablet  Nursing Home Yes Yes    Take 1 tablet by mouth Daily.    ammonium lactate (LAC-HYDRIN) 12 % lotion  Nursing Home Yes Yes    Apply 1 application  topically to the appropriate area as directed 2 (Two) Times a Day. Apply to BLE    apixaban (ELIQUIS) 5 MG tablet tablet  Nursing Home No Yes    Take 1 tablet by mouth Every 12 (Twelve) Hours.    Artificial Tear Ointment (artificial tears) ophthalmic ointment  Nursing Home Yes Yes    Administer 1 application  to both eyes Every Night.    aspirin 81 MG EC tablet  Nursing Home Yes Yes    Take 1 tablet by mouth Daily.    atorvastatin (LIPITOR) 10 MG tablet  Nursing Home Yes Yes    Take 1 tablet by mouth Every Night.    carvedilol (COREG) 25 MG tablet  Nursing Home No Yes    Take 1 tablet by mouth 2 (Two) Times a Day With Meals.    Patient taking differently:  Take 1 tablet by mouth 2 (Two) Times a Day.    cetirizine (zyrTEC) 10 MG tablet  Nursing Home Yes Yes    Take 1 tablet by mouth Every Night.    metFORMIN (GLUCOPHAGE) 500 MG tablet  Nursing Home Yes Yes    Take 1 tablet by mouth Daily.    povidone-iodine (BETADINE) 10 % external solution   Nursing Home Yes Yes    Apply 1 application  topically to the appropriate area as directed 3 (Three) Times a Day. Apply to right 2nd toe    dextrose (GLUTOSE) 40 % gel  Nursing Home Yes No    Take 15 g by mouth Every 1 (One) Hour As Needed for Low Blood Sugar.    glipiZIDE (GLUCOTROL) 10 MG tablet   Yes No    Take 1 tablet by mouth Every Morning.    glipiZIDE (GLUCOTROL) 5 MG tablet   Yes No    Take 1 tablet by mouth Every Evening.    glucagon (GLUCAGEN) 1 MG injection  Nursing Home Yes No    Infuse 1 mg into a venous catheter 1 (One) Time As Needed for Low Blood Sugar.    insulin regular (humuLIN R,novoLIN R) 100 UNIT/ML injection  Nursing Home Yes No    Inject 2-8 Units under the skin into the appropriate area as directed 3 (Three) Times a Day Before Meals. PER SS: 150-200=2, 201-250=4, 251-300=6, 301-350=8    polyethylene glycol (MIRALAX) packet  Nursing Home Yes No    Take 17 g by mouth Daily As Needed.    torsemide (DEMADEX) 20 MG tablet   No No    Take 1 tablet by mouth Daily.              Medication notes:     This medication list is complete to the best of my knowledge as of 8/14/2023    Please call if questions.    Alonso Mcclendon  Medication History Technician   [phone]    8/14/2023 14:36 EDT

## 2023-08-15 LAB
ANION GAP SERPL CALCULATED.3IONS-SCNC: 8 MMOL/L (ref 5–15)
BASOPHILS # BLD AUTO: 0.03 10*3/MM3 (ref 0–0.2)
BASOPHILS NFR BLD AUTO: 0.3 % (ref 0–1.5)
BUN SERPL-MCNC: 22 MG/DL (ref 8–23)
BUN/CREAT SERPL: 13.3 (ref 7–25)
CALCIUM SPEC-SCNC: 8.8 MG/DL (ref 8.6–10.5)
CHLORIDE SERPL-SCNC: 102 MMOL/L (ref 98–107)
CO2 SERPL-SCNC: 28 MMOL/L (ref 22–29)
CREAT SERPL-MCNC: 1.66 MG/DL (ref 0.76–1.27)
DEPRECATED RDW RBC AUTO: 44.4 FL (ref 37–54)
EGFRCR SERPLBLD CKD-EPI 2021: 43.5 ML/MIN/1.73
EOSINOPHIL # BLD AUTO: 0.03 10*3/MM3 (ref 0–0.4)
EOSINOPHIL NFR BLD AUTO: 0.3 % (ref 0.3–6.2)
ERYTHROCYTE [DISTWIDTH] IN BLOOD BY AUTOMATED COUNT: 13.6 % (ref 12.3–15.4)
GLUCOSE SERPL-MCNC: 169 MG/DL (ref 65–99)
HCT VFR BLD AUTO: 35.4 % (ref 37.5–51)
HGB BLD-MCNC: 11.5 G/DL (ref 13–17.7)
IMM GRANULOCYTES # BLD AUTO: 0.07 10*3/MM3 (ref 0–0.05)
IMM GRANULOCYTES NFR BLD AUTO: 0.8 % (ref 0–0.5)
LYMPHOCYTES # BLD AUTO: 1.11 10*3/MM3 (ref 0.7–3.1)
LYMPHOCYTES NFR BLD AUTO: 12.3 % (ref 19.6–45.3)
MCH RBC QN AUTO: 28.9 PG (ref 26.6–33)
MCHC RBC AUTO-ENTMCNC: 32.5 G/DL (ref 31.5–35.7)
MCV RBC AUTO: 88.9 FL (ref 79–97)
MONOCYTES # BLD AUTO: 0.74 10*3/MM3 (ref 0.1–0.9)
MONOCYTES NFR BLD AUTO: 8.2 % (ref 5–12)
NEUTROPHILS NFR BLD AUTO: 7.03 10*3/MM3 (ref 1.7–7)
NEUTROPHILS NFR BLD AUTO: 78.1 % (ref 42.7–76)
NRBC BLD AUTO-RTO: 0 /100 WBC (ref 0–0.2)
NT-PROBNP SERPL-MCNC: 2590 PG/ML (ref 0–900)
PLATELET # BLD AUTO: 116 10*3/MM3 (ref 140–450)
PMV BLD AUTO: 11.9 FL (ref 6–12)
POTASSIUM SERPL-SCNC: 4.2 MMOL/L (ref 3.5–5.2)
QT INTERVAL: 399 MS
RBC # BLD AUTO: 3.98 10*6/MM3 (ref 4.14–5.8)
SODIUM SERPL-SCNC: 138 MMOL/L (ref 136–145)
TROPONIN T SERPL HS-MCNC: 26 NG/L
WBC NRBC COR # BLD: 9.01 10*3/MM3 (ref 3.4–10.8)

## 2023-08-15 PROCEDURE — 92610 EVALUATE SWALLOWING FUNCTION: CPT

## 2023-08-15 PROCEDURE — 25010000002 PIPERACILLIN SOD-TAZOBACTAM PER 1 G: Performed by: HOSPITALIST

## 2023-08-15 PROCEDURE — 84484 ASSAY OF TROPONIN QUANT: CPT | Performed by: HOSPITALIST

## 2023-08-15 PROCEDURE — 83880 ASSAY OF NATRIURETIC PEPTIDE: CPT | Performed by: HOSPITALIST

## 2023-08-15 PROCEDURE — 85025 COMPLETE CBC W/AUTO DIFF WBC: CPT | Performed by: HOSPITALIST

## 2023-08-15 PROCEDURE — 80048 BASIC METABOLIC PNL TOTAL CA: CPT | Performed by: HOSPITALIST

## 2023-08-15 RX ORDER — ASPIRIN 81 MG/1
81 TABLET ORAL DAILY
Status: DISCONTINUED | OUTPATIENT
Start: 2023-08-15 | End: 2023-08-18 | Stop reason: HOSPADM

## 2023-08-15 RX ORDER — CARVEDILOL 12.5 MG/1
12.5 TABLET ORAL 2 TIMES DAILY WITH MEALS
Status: DISCONTINUED | OUTPATIENT
Start: 2023-08-15 | End: 2023-08-18 | Stop reason: HOSPADM

## 2023-08-15 RX ORDER — ATORVASTATIN CALCIUM 20 MG/1
10 TABLET, FILM COATED ORAL NIGHTLY
Status: DISCONTINUED | OUTPATIENT
Start: 2023-08-15 | End: 2023-08-18 | Stop reason: HOSPADM

## 2023-08-15 RX ORDER — ALLOPURINOL 100 MG/1
100 TABLET ORAL DAILY
Status: DISCONTINUED | OUTPATIENT
Start: 2023-08-15 | End: 2023-08-18 | Stop reason: HOSPADM

## 2023-08-15 RX ORDER — CARVEDILOL 25 MG/1
25 TABLET ORAL 2 TIMES DAILY WITH MEALS
Status: DISCONTINUED | OUTPATIENT
Start: 2023-08-15 | End: 2023-08-15

## 2023-08-15 RX ADMIN — CARVEDILOL 12.5 MG: 12.5 TABLET, FILM COATED ORAL at 17:48

## 2023-08-15 RX ADMIN — SODIUM CHLORIDE 75 ML/HR: 9 INJECTION, SOLUTION INTRAVENOUS at 09:48

## 2023-08-15 RX ADMIN — SODIUM CHLORIDE 75 ML/HR: 9 INJECTION, SOLUTION INTRAVENOUS at 19:28

## 2023-08-15 RX ADMIN — APIXABAN 5 MG: 5 TABLET, FILM COATED ORAL at 20:51

## 2023-08-15 RX ADMIN — PIPERACILLIN SODIUM AND TAZOBACTAM SODIUM 3.38 G: 3; .375 INJECTION, SOLUTION INTRAVENOUS at 06:25

## 2023-08-15 RX ADMIN — ASPIRIN 81 MG: 81 TABLET, COATED ORAL at 11:27

## 2023-08-15 RX ADMIN — GUAIFENESIN 600 MG: 600 TABLET, EXTENDED RELEASE ORAL at 11:27

## 2023-08-15 RX ADMIN — ALLOPURINOL 100 MG: 100 TABLET ORAL at 14:39

## 2023-08-15 RX ADMIN — APIXABAN 5 MG: 5 TABLET, FILM COATED ORAL at 11:27

## 2023-08-15 RX ADMIN — GUAIFENESIN 600 MG: 600 TABLET, EXTENDED RELEASE ORAL at 20:51

## 2023-08-15 RX ADMIN — FAMOTIDINE 20 MG: 20 TABLET, FILM COATED ORAL at 11:27

## 2023-08-15 RX ADMIN — PIPERACILLIN SODIUM AND TAZOBACTAM SODIUM 3.38 G: 3; .375 INJECTION, SOLUTION INTRAVENOUS at 14:39

## 2023-08-15 RX ADMIN — ATORVASTATIN CALCIUM 10 MG: 20 TABLET, FILM COATED ORAL at 20:51

## 2023-08-15 RX ADMIN — PIPERACILLIN SODIUM AND TAZOBACTAM SODIUM 3.38 G: 3; .375 INJECTION, SOLUTION INTRAVENOUS at 22:31

## 2023-08-15 RX ADMIN — FAMOTIDINE 20 MG: 20 TABLET, FILM COATED ORAL at 20:51

## 2023-08-15 NOTE — CASE MANAGEMENT/SOCIAL WORK
Discharge Planning Assessment  [facility]     Patient Name: Efra Tam  MRN: [id number]  Today's Date: 8/15/2023    Admit Date: 8/14/2023    Plan: [facility]  level of care with bed hold   Discharge Needs Assessment       Row Name 08/15/23 1042       Living Environment    People in Home facility resident    Current Living Arrangements extended care facility    Potentially Unsafe Housing Conditions none    Primary Care Provided by self    Provides Primary Care For no one    Family Caregiver if Needed child(chandni), adult    Quality of Family Relationships unable to assess    Able to Return to Prior Arrangements yes       Resource/Environmental Concerns    Resource/Environmental Concerns none       Transition Planning    Patient/Family Anticipates Transition to long-term care facility    Transportation Anticipated health plan transportation       Discharge Needs Assessment    Readmission Within the Last 30 Days no previous admission in last 30 days    Equipment Currently Used at Home wheelchair    Equipment Needed After Discharge none    Discharge Facility/Level of Care Needs nursing facility, intermediate    Provided Post Acute Provider List? N/A                   Discharge Plan       Row Name 08/15/23 1047       Plan    Plan [facility] level of care with bed hold    Patient/Family in Agreement with Plan yes    Plan Comments Spoke with Radha at [facility] who confirms pt is LTC with bed hold and can return at d/c. VM left for daughter Tana to confirm d/c plan. Packet in office. Will need EMS. CCP to follow.                  Continued Care and Services - Admitted Since 8/14/2023       Destination Coordination complete.      Service Provider Request Status Selected Services Address Phone Fax Patient Preferred    [facility]  [facility] [address] [facility] [address] [phone] --                  Expected Discharge Date and Time       Expected  Discharge Date Expected Discharge Time    Aug 18, 2023            Demographic Summary    No documentation.                  Functional Status    No documentation.                  Psychosocial    No documentation.                  Abuse/Neglect    No documentation.                  Legal    No documentation.                  Substance Abuse    No documentation.                  Patient Forms    No documentation.                     EMILY Krishnan

## 2023-08-15 NOTE — PLAN OF CARE
Problem: Adult Inpatient Plan of Care  Goal: Plan of Care Review  Outcome: Ongoing, Progressing  Flowsheets (Taken 8/15/2023 1619)  Progress: improving  Plan of Care Reviewed With: patient  Outcome Evaluation: Pt vitals stable on room air. Speech ordered reg diet no mix consistencies. Q 2 turn. Heel boots on. zosyn continued. IVF adjusted. ECHO ordered. Pt safety maintained

## 2023-08-15 NOTE — THERAPY EVALUATION
Acute Care - Speech Language Pathology NICU/PEDS Initial Evaluation  [facility]       Patient Name: Efra Tam  : 1950  MRN: [id number]  Today's Date: 8/15/2023                   Admit Date: 2023       Visit Dx:      ICD-10-CM ICD-9-CM   1. Syncope and collapse  R55 780.2   2. Acute respiratory failure with hypoxia  J96.01 518.81   3. Aspiration pneumonia of both lungs, unspecified aspiration pneumonia type, unspecified part of lung  J69.0 507.0   4. History of stroke  Z86.73 V12.54   5. History of atrial fibrillation  Z86.79 V12.59       Patient Active Problem List   Diagnosis    ANIBAL (acute kidney injury)    Stroke    Hemiparesis    Hypertension    Chronic systolic CHF (congestive heart failure)    Atrial fibrillation    Combined receptive and expressive 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    Type 2 diabetes mellitus with renal complication    Dehydration    Metabolic encephalopathy    Hypoxia    CKD (chronic kidney disease) stage 2, GFR 60-89 ml/min    Sepsis secondary to UTI    Aspiration pneumonia        Past Medical History:   Diagnosis Date    Atrial fibrillation     Chronic systolic CHF (congestive heart failure)     Combined receptive and expressive 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     Gout     Hemiparesis     R sided    Hyperlipidemia     Hypertension     Stroke         Past Surgical History:   Procedure Laterality Date    FRACTURE SURGERY         SLP Recommendation and Plan                 Therapy Frequency (Swallow): PRN (08/15/23 1000)  Predicted Duration Therapy Intervention (Days): until discharge (08/15/23 1000)  Oral Care Recommendations: Oral Care BID/PRN (08/15/23 1000)                Plan of Care Review                            EDUCATION  Education completed in the following areas:   Dysphagia (Swallowing Impairment) Oral Care/Hydration Modified Diet Instruction.         SLP GOALS       Row Name 08/15/23 1000             (STG) Patient will tolerate trials of    Consistencies Trialed  (Tolerate trials) regular textures;thin liquids  -AW      Desired Outcome (Tolerate trials) without signs/symptoms of aspiration  -AW      [place] (Tolerate trials) independently (over 90% accuracy)  -AW      Time Frame (Tolerate trials) by discharge  -AW                User Key  (r) = Recorded By, (t) = Taken By, (c) = Cosigned By      Initials Name Provider Type    Meri Cerna MS CCC-SLP Speech and Language Pathologist                             Time Calculation:    Time Calculation- SLP       Row Name 08/15/23 1029             Time Calculation- SLP    SLP Start Time 0900  -AW      SLP Received On 08/15/23  -AW                User Key  (r) = Recorded By, (t) = Taken By, (c) = Cosigned By      Initials Name Provider Type    Meri Cerna MS CCC-SLP Speech and Language Pathologist                      Therapy Charges for Today       Code Description Service Date Service Provider Modifiers Qty    [id number]  ST EVAL ORAL PHARYNG SWALLOW 4 8/15/2023 Meri Dhillon MS CCC-SLP GN 1                        Meri Dhillon MS CCC-SLP  8/15/2023

## 2023-08-15 NOTE — PLAN OF CARE
Goal Outcome Evaluation:      No acute issues tonight. Vitals stable. On room air. Remains hypertensive - PRN hydralazine given with good result. Fluids infusing. IV zosyn continued. Q2 turns implemented. Heel boots in place. Currently NPO with sips with meds until speech therapy evaluates today. Sleeping at this time.

## 2023-08-15 NOTE — H&P
History and physical    Primary care physician  Dr. Bauman    Chief complaint  Shortness of breath    History of present illness  73-year-old white male with multiple medical problems including chronic atrial fibrillation congestive heart failure chronic kidney disease stage II CVA with right hemiparesis diabetes mellitus hypertension hyperlipidemia who is a nursing home resident presented to [facility] emergency room with shortness of breath started yesterday.  Patient apparently had a syncopal episode at nursing home.  Patient evaluated in ER found to have pneumonia with acute hypoxic respiratory failure admit for management.  Patient did have nausea vomiting and apparently aspirated with no coffee-ground emesis.  At the time of entry was awake and alert passed the swallow study and eating and feeling better.    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 No pertinent family history.     SOCIAL HISTORY                Socioeconomic History    Marital status:    Tobacco Use    Smoking status: Never    Smokeless tobacco: Never   Substance and Sexual Activity    Alcohol use: No    Drug use: No    Sexual activity: Never         ALLERGIES  Patient has no known allergies.  Home medications reviewed     REVIEW OF SYSTEMS  All systems  "reviewed and negative except for those discussed in HPI.      PHYSICAL EXAM   Blood pressure 114/63, pulse 84, temperature 97.9 øF (36.6 øC), temperature source Oral, resp. rate 18, height 200.7 cm (79.02\"), weight 112 kg (246 lb 14.6 oz), SpO2 93 %.    GENERAL: Awake and alert in no distress  HEENT: Unremarkable  NECK: Supple  CV: Irregularly irregular S1-S2  RESPIRATORY: Mild respiratory distress with rhonchi in all lung fields and upper airway noises  ABDOMEN: softly distended but nontender with diminished bowel sounds  MUSCULOSKELETAL: no deformity  NEURO: Awake with aphasia and right-sided weakness from prior stroke  PSYCH:  calm, cooperative  SKIN: warm, dry     LAB RESULTS  Lab Results (last 24 hours)       Procedure Component Value Units Date/Time    BNP [[id number]] Collected: 08/15/23 1246    Specimen: Blood Updated: 08/15/23 1308    Basic Metabolic Panel [[id number]]  (Abnormal) Collected: 08/15/23 0441    Specimen: Blood Updated: 08/15/23 0547     Glucose 169 mg/dL      BUN 22 mg/dL      Creatinine 1.66 mg/dL      Sodium 138 mmol/L      Potassium 4.2 mmol/L      Comment: Slight hemolysis detected by analyzer. Results may be affected.        Chloride 102 mmol/L      CO2 28.0 mmol/L      Calcium 8.8 mg/dL      BUN/Creatinine Ratio 13.3     Anion Gap 8.0 mmol/L      eGFR 43.5 mL/min/1.73     Narrative:      GFR Normal >60  Chronic Kidney Disease <60  Kidney Failure <15    The GFR formula is only valid for adults with stable renal function between ages 18 and 70.    High Sensitivity Troponin T [[id number]]  (Abnormal) Collected: 08/15/23 0441    Specimen: Blood Updated: 08/15/23 0540     HS Troponin T 26 ng/L     Narrative:      High Sensitive Troponin T Reference Range:  <10.0 ng/L- Negative Female for AMI  <15.0 ng/L- Negative Male for AMI  >=10 - Abnormal Female indicating possible myocardial injury.  >=15 - Abnormal Male indicating possible myocardial injury.   Clinicians would have to utilize clinical " acumen, EKG, Troponin, and serial changes to determine if it is an Acute Myocardial Infarction or myocardial injury due to an underlying chronic condition.         CBC & Differential [[id number]]  (Abnormal) Collected: 08/15/23 0441    Specimen: Blood Updated: 08/15/23 0528    Narrative:      The following orders were created for panel order CBC & Differential.  Procedure                               Abnormality         Status                     ---------                               -----------         ------                     CBC Auto Differential[[id number]]        Abnormal            Final result                 Please view results for these tests on the individual orders.    CBC Auto Differential [[id number]]  (Abnormal) Collected: 08/15/23 0441    Specimen: Blood Updated: 08/15/23 0528     WBC 9.01 10*3/mm3      RBC 3.98 10*6/mm3      Hemoglobin 11.5 g/dL      Hematocrit 35.4 %      MCV 88.9 fL      MCH 28.9 pg      MCHC 32.5 g/dL      RDW 13.6 %      RDW-SD 44.4 fl      MPV 11.9 fL      Platelets 116 10*3/mm3      Neutrophil % 78.1 %      Lymphocyte % 12.3 %      Monocyte % 8.2 %      Eosinophil % 0.3 %      Basophil % 0.3 %      Immature Grans % 0.8 %      Neutrophils, Absolute 7.03 10*3/mm3      Lymphocytes, Absolute 1.11 10*3/mm3      Monocytes, Absolute 0.74 10*3/mm3      Eosinophils, Absolute 0.03 10*3/mm3      Basophils, Absolute 0.03 10*3/mm3      Immature Grans, Absolute 0.07 10*3/mm3      nRBC 0.0 /100 WBC     High Sensitivity Troponin T 2Hr [[id number]]  (Abnormal) Collected: 08/14/23 1702    Specimen: Blood Updated: 08/14/23 1734     HS Troponin T 21 ng/L      Troponin T Delta 1 ng/L     Narrative:      High Sensitive Troponin T Reference Range:  <10.0 ng/L- Negative Female for AMI  <15.0 ng/L- Negative Male for AMI  >=10 - Abnormal Female indicating possible myocardial injury.  >=15 - Abnormal Male indicating possible myocardial injury.   Clinicians would have to utilize clinical acumen, EKG,  "Troponin, and serial changes to determine if it is an Acute Myocardial Infarction or myocardial injury due to an underlying chronic condition.         High Sensitivity Troponin T [[id number]]  (Abnormal) Collected: 08/14/23 1509    Specimen: Blood Updated: 08/14/23 1544     HS Troponin T 20 ng/L     Narrative:      High Sensitive Troponin T Reference Range:  <10.0 ng/L- Negative Female for AMI  <15.0 ng/L- Negative Male for AMI  >=10 - Abnormal Female indicating possible myocardial injury.  >=15 - Abnormal Male indicating possible myocardial injury.   Clinicians would have to utilize clinical acumen, EKG, Troponin, and serial changes to determine if it is an Acute Myocardial Infarction or myocardial injury due to an underlying chronic condition.         Procalcitonin [[id number]]  (Normal) Collected: 08/14/23 1509    Specimen: Blood Updated: 08/14/23 1544     Procalcitonin 0.07 ng/mL     Narrative:      As a Marker for Sepsis (Non-Neonates):    1. <0.5 ng/mL represents a low risk of severe sepsis and/or septic shock.  2. >2 ng/mL represents a high risk of severe sepsis and/or septic shock.    As a Marker for Lower Respiratory Tract Infections that require antibiotic therapy:    PCT on Admission    Antibiotic Therapy       6-12 Hrs later    >0.5                Strongly Recommended  >0.25 - <0.5        Recommended   0.1 - 0.25          Discouraged              Remeasure/reassess PCT  <0.1                Strongly Discouraged     Remeasure/reassess PCT    As 28 day mortality risk marker: \"Change in Procalcitonin Result\" (>80% or <=80%) if Day 0 (or Day 1) and Day 4 values are available. Refer to http://www.[facility]-pct-calculator.com    Change in PCT <=80%  A decrease of PCT levels below or equal to 80% defines a positive change in PCT test result representing a higher risk for 28-day all-cause mortality of patients diagnosed with severe sepsis for septic shock.    Change in PCT >80%  A decrease of PCT levels of more than " 80% defines a negative change in PCT result representing a lower risk for 28-day all-cause mortality of patients diagnosed with severe sepsis or septic shock.       TSH [[id number]]  (Normal) Collected: 08/14/23 1509    Specimen: Blood Updated: 08/14/23 1544     TSH 3.240 uIU/mL     T4, Free [[id number]]  (Normal) Collected: 08/14/23 1509    Specimen: Blood Updated: 08/14/23 1544     Free T4 1.20 ng/dL     Narrative:      Results may be falsely increased if patient taking Biotin.      Magnesium [[id number]]  (Normal) Collected: 08/14/23 1509    Specimen: Blood Updated: 08/14/23 1538     Magnesium 2.0 mg/dL     Comprehensive Metabolic Panel [[id number]]  (Abnormal) Collected: 08/14/23 1509    Specimen: Blood Updated: 08/14/23 1538     Glucose 217 mg/dL      BUN 21 mg/dL      Creatinine 1.64 mg/dL      Sodium 137 mmol/L      Potassium 5.0 mmol/L      Chloride 101 mmol/L      CO2 27.4 mmol/L      Calcium 9.9 mg/dL      Total Protein 7.6 g/dL      Albumin 4.1 g/dL      ALT (SGPT) 9 U/L      AST (SGOT) 9 U/L      Alkaline Phosphatase 80 U/L      Total Bilirubin 0.8 mg/dL      Globulin 3.5 gm/dL      A/G Ratio 1.2 g/dL      BUN/Creatinine Ratio 12.8     Anion Gap 8.6 mmol/L      eGFR 44.2 mL/min/1.73     Narrative:      GFR Normal >60  Chronic Kidney Disease <60  Kidney Failure <15    The GFR formula is only valid for adults with stable renal function between ages 18 and 70.    Blood Culture - Blood, Arm, Left [[id number]] Collected: 08/14/23 1503    Specimen: Blood from Arm, Left Updated: 08/14/23 1507    Lactic Acid, Plasma [[id number]]  (Normal) Collected: 08/14/23 1421    Specimen: Blood Updated: 08/14/23 1455     Lactate 1.8 mmol/L     Protime-INR [[id number]]  (Abnormal) Collected: 08/14/23 1421    Specimen: Blood Updated: 08/14/23 1449     Protime 18.5 Seconds      INR 1.51    Blood Culture - Blood, Arm, Right [[id number]] Collected: 08/14/23 1443    Specimen: Blood from Arm, Right Updated: 08/14/23 1446    CBC &  Differential [[id number]]  (Abnormal) Collected: 08/14/23 1421    Specimen: Blood Updated: 08/14/23 1440    Narrative:      The following orders were created for panel order CBC & Differential.  Procedure                               Abnormality         Status                     ---------                               -----------         ------                     CBC Auto Differential[[id number]]        Abnormal            Final result                 Please view results for these tests on the individual orders.    CBC Auto Differential [[id number]]  (Abnormal) Collected: 08/14/23 1421    Specimen: Blood Updated: 08/14/23 1440     WBC 12.64 10*3/mm3      RBC 4.35 10*6/mm3      Hemoglobin 12.6 g/dL      Hematocrit 39.0 %      MCV 89.7 fL      MCH 29.0 pg      MCHC 32.3 g/dL      RDW 13.7 %      RDW-SD 45.2 fl      MPV 11.6 fL      Platelets 140 10*3/mm3      Neutrophil % 80.3 %      Lymphocyte % 8.9 %      Monocyte % 6.3 %      Eosinophil % 2.8 %      Basophil % 0.5 %      Neutrophils, Absolute 10.16 10*3/mm3      Lymphocytes, Absolute 1.13 10*3/mm3      Monocytes, Absolute 0.79 10*3/mm3      Eosinophils, Absolute 0.35 10*3/mm3      Basophils, Absolute 0.06 10*3/mm3           Imaging Results (Last 24 Hours)       Procedure Component Value Units Date/Time    CT Head Without Contrast [[id number]] Collected: 08/14/23 1629     Updated: 08/14/23 1633    Narrative:      CT HEAD WITHOUT CONTRAST     HISTORY: Nausea, vomiting.     COMPARISON: CT head 05/19/2019.     There is moderate atrophy. A remote infarct involving the medial aspect  of the left temporal and occipital lobes is appreciated which was acute  to subacute on the MRI examination of the brain performed on 04/21/2019.  There is volume loss and secondary enlargement of the left temporal  horn. There is no evidence of hemorrhage or of a focal area of decreased  attenuation to suggest acute infarction. Moderate small vessel ischemic  disease is noted.     There is  mild-to-moderate mucosal thickening involving the right  maxillary sinus and moderate-to-severe mucosal thickening and nearly  complete opacification of the left maxillary sinus. Moderate mucosal  thickening involving the ethmoid air cells is appreciated bilaterally.       Impression:      A remote left PCA distribution infarct is appreciated.  Paranasal sinus disease is noted as described above. There is no  evidence of acute infarction or of intracranial hemorrhage. Further  evaluation could be performed with an MRI examination of the brain as  indicated.           Radiation dose reduction techniques were utilized, including automated  exposure control and exposure modulation based on body size.     This report was finalized on 8/14/2023 4:30 PM by Dr. Davian Chowdhury M.D.       CT Chest Without Contrast Diagnostic [[id number]] Collected: 08/14/23 1551     Updated: 08/14/23 1558    Narrative:      Examination: CT of the chest, abdomen, and pelvis without contrast     TECHNIQUE: CT of the chest, abdomen, and pelvis without intravenous  contrast per protocol. Radiation dose reduction techniques were  utilized, including automated exposure control and exposure modulation  based on body size. The examination is limited by patient motion.     HISTORY:Nausea, vomiting, shortness of breath, and gurgling after  vomiting. Stroke with right-sided hemiparesis     COMPARISON:None available     FINDINGS:Chest:     There are bilateral diffusely distributed pulmonary groundglass nodular  opacities. No pleural effusion or pneumothorax are seen. The central  airways are patent.     No enlarged supraclavicular, axillary, or mediastinal lymph nodes are  seen. The mediastinal vasculature is normal in caliber. There are  coronary calcifications. Aortic valve calcifications are seen. The heart  is normal in size, without pericardial effusion.     Abdomen and pelvis:      The liver, gallbladder, spleen, adrenal glands, kidneys,  pancreas,  stomach, small bowel, large bowel aside from diverticula, urinary  bladder, and abdominal vasculature are normal. No intraperitoneal fluid  collection or free gas are seen. No enlarged lymph nodes are  demonstrated.     Bone windows demonstrate degenerative changes, without suspicious  osseous lesion seen. There are bilateral L5-S1 pars defects with grade 1  anterolisthesis of L5 on S1       Impression:      1. Pulmonary groundglass opacities, likely infectious or inflammatory  2. Incidental findings as above.     This report was finalized on 8/14/2023 3:54 PM by Dr. Jose Daniel Espinoza M.D.       CT Abdomen Pelvis Without Contrast [[id number]] Collected: 08/14/23 1551     Updated: 08/14/23 1558    Narrative:      Examination: CT of the chest, abdomen, and pelvis without contrast     TECHNIQUE: CT of the chest, abdomen, and pelvis without intravenous  contrast per protocol. Radiation dose reduction techniques were  utilized, including automated exposure control and exposure modulation  based on body size. The examination is limited by patient motion.     HISTORY:Nausea, vomiting, shortness of breath, and gurgling after  vomiting. Stroke with right-sided hemiparesis     COMPARISON:None available     FINDINGS:Chest:     There are bilateral diffusely distributed pulmonary groundglass nodular  opacities. No pleural effusion or pneumothorax are seen. The central  airways are patent.     No enlarged supraclavicular, axillary, or mediastinal lymph nodes are  seen. The mediastinal vasculature is normal in caliber. There are  coronary calcifications. Aortic valve calcifications are seen. The heart  is normal in size, without pericardial effusion.     Abdomen and pelvis:      The liver, gallbladder, spleen, adrenal glands, kidneys, pancreas,  stomach, small bowel, large bowel aside from diverticula, urinary  bladder, and abdominal vasculature are normal. No intraperitoneal fluid  collection or free gas are seen. No  enlarged lymph nodes are  demonstrated.     Bone windows demonstrate degenerative changes, without suspicious  osseous lesion seen. There are bilateral L5-S1 pars defects with grade 1  anterolisthesis of L5 on S1       Impression:      1. Pulmonary groundglass opacities, likely infectious or inflammatory  2. Incidental findings as above.     This report was finalized on 8/14/2023 3:54 PM by Dr. Jose Daniel Espinoza M.D.       XR Chest 1 View [[id number]] Collected: 08/14/23 1454     Updated: 08/14/23 1458    Narrative:      XR CHEST 1 VW-     HISTORY: Male who is 73 years-old,  short of breath     TECHNIQUE: Frontal view of the chest     COMPARISON: 05/19/2019     FINDINGS: The heart is mildly enlarged. Pulmonary vasculature is mildly  congested. Aorta is tortuous. No focal pulmonary consolidation, pleural  effusion, or pneumothorax. No acute osseous process.       Impression:      No focal pulmonary consolidation. Mild cardiomegaly and  pulmonary vascular congestion. Tortuous aorta.     This report was finalized on 8/14/2023 2:55 PM by Dr. Jay Qunitero M.D.             Results    [facility]         Author: -- Service: -- Author Type: --   Filed: Date of Service: Creation Time:   Status: (Other)   HEART RATE= 78  bpm  RR Interval= 769  ms  PA Interval=   ms  P Horizontal Axis=   deg  P Front Axis=   deg  QRSD Interval= 89  ms  QT Interval= 399  ms  QRS Axis= -16  deg  T Wave Axis= 54  deg  - ABNORMAL ECG -  Atrial fibrillation  Borderline left axis deviation  Borderline low voltage, extremity leads  When compared with ECG of 19-May-2019 14:29:40,  Significant rate decrease otherwise no change          Current Facility-Administered Medications:     acetaminophen (TYLENOL) 160 MG/5ML solution 650 mg, 650 mg, Oral, Q6H PRN, Bernard Paz MD    acetaminophen (TYLENOL) suppository 650 mg, 650 mg, Rectal, Q4H PRN, Bernard Paz MD    allopurinol (ZYLOPRIM) tablet 100 mg, 100  mg, Oral, Daily, Karthik Paz MD    apixaban (ELIQUIS) tablet 5 mg, 5 mg, Oral, Q12H, Karthik Paz MD, 5 mg at 08/15/23 1127    aspirin EC tablet 81 mg, 81 mg, Oral, Daily, Karthik Paz MD, 81 mg at 08/15/23 1127    atorvastatin (LIPITOR) tablet 10 mg, 10 mg, Oral, Nightly, Karthik Paz MD    carvedilol (COREG) tablet 12.5 mg, 12.5 mg, Oral, BID With Meals, Karthik Paz MD    famotidine (PEPCID) tablet 20 mg, 20 mg, Oral, BID, Karthik Paz MD, 20 mg at 08/15/23 1127    guaiFENesin (MUCINEX) 12 hr tablet 600 mg, 600 mg, Oral, Q12H, Karthik Paz MD, 600 mg at 08/15/23 1127    hydrALAZINE (APRESOLINE) injection 10 mg, 10 mg, Intravenous, Q4H PRN, Karthik Paz MD, 10 mg at 08/14/23 2217    ipratropium-albuterol (DUO-NEB) nebulizer solution 3 mL, 3 mL, Nebulization, Q4H PRN, Karthik Paz MD    piperacillin-tazobactam (ZOSYN) 3.375 g in iso-osmotic dextrose 50 ml (premix), 3.375 g, Intravenous, Q8H, Karthik Paz MD, 3.375 g at 08/15/23 0625    [COMPLETED] Insert Peripheral IV, , , Once **AND** sodium chloride 0.9 % flush 10 mL, 10 mL, Intravenous, PRN, Usman Naylor MD    sodium chloride 0.9 % infusion, 125 mL/hr, Intravenous, Continuous, Karthik Paz MD, Last Rate: 125 mL/hr at 08/15/23 1129, 125 mL/hr at 08/15/23 1129     ASSESSMENT  Acute hypoxic respiratory failure  Bilateral pneumonia probably aspiration  Chronic atrial fibrillation  Congestive heart failure  Diabetes mellitus  Hypertension  Hyperlipidemia  History of CVA with right hemiparesis  Chronic kidney disease stage II  Gastroesophageal reflux disease    PLAN  Admit  Supplemental oxygen nebulizer  IV antibiotics after obtaining cultures  Swallow eval  Adjust nursing home medications  Supportive care  PT OT  Patient is full code  Discussed with nursing staff  Follow closely further recommendation current hospital course    KARTHIK PAZ MD

## 2023-08-15 NOTE — PLAN OF CARE
Goal Outcome Evaluation: Swallow eval completed. Pt tolerated thins (cup/straw), pureed, soft, and regular coonsistencies with no s/s. Pt coughed in 1/6 trials of mixed. Vocal quality remained clear throughout assessment. Laryngeal elevation was adequate with palpation. Recommend regular w/ thin, no mixed; meds w/ thin or pureed; upright for meals and 30 min after slow rate; small bites/sips. ST to follow for tolerance and need for instrumental.

## 2023-08-15 NOTE — DISCHARGE PLACEMENT REQUEST
"Lisa Cardoza \"GEOFF\" (73 y.o. Male)       Date of Birth   1950    Social Security Number       Address   [facility] [place] [address]    Home Phone   [phone]    MRN   [id number]       [employer]   None    Marital Status                               Admission Date   8/14/23    Admission Type   Emergency    Admitting Provider   Bernard Paz MD    Attending Provider   Bernard Paz MD    Department, Room/Bed   [address], [address]/1       Discharge Date       Discharge Disposition       Discharge Destination                                 Attending Provider: Bernard Paz MD    Allergies: No Known Allergies    Isolation: None   Infection: None   Code Status: Prior    Ht: 200.7 cm (79.02\")   Wt: 112 kg (246 lb 14.6 oz)    Admission Cmt: None   Principal Problem: Aspiration pneumonia [J69.0]                   Active Insurance as of 8/14/2023       Primary Coverage       Payor Plan Insurance Group Employer/Plan Group    MEDICARE MEDICARE A & B        Payor Plan Address Payor Plan Phone Number Payor Plan Fax Number Effective Dates    [address] [phone]  8/1/2016 - None Entered    [place] [id number]         Subscriber Name Subscriber Birth Date Member ID       LISA CARDOZA 1950 [id number]               Secondary Coverage       Payor Plan Insurance Group Employer/Plan Group    [employer]                  Payor Plan Address Payor Plan Phone Number Payor Plan Fax Number Effective Dates    [address]   11/1/2017 - None Entered    [place] [id number]         Subscriber Name Subscriber Birth Date Member ID       LISA CARDOZA 1950 [id number]                     Emergency Contacts        (Rel.) Home Phone Work Phone Mobile Phone    ALANNA SALEH (Daughter) -- -- [phone]    NANIPAOLA (Son) -- -- [phone]    Abbey Marin (Other) -- -- [phone]      "

## 2023-08-16 ENCOUNTER — APPOINTMENT (OUTPATIENT)
Dept: CARDIOLOGY | Facility: HOSPITAL | Age: 73
DRG: 177 | End: 2023-08-16
Payer: MEDICARE

## 2023-08-16 LAB
ALBUMIN SERPL-MCNC: 3.2 G/DL (ref 3.5–5.2)
ALBUMIN/GLOB SERPL: 1.1 G/DL
ALP SERPL-CCNC: 55 U/L (ref 39–117)
ALT SERPL W P-5'-P-CCNC: 8 U/L (ref 1–41)
ANION GAP SERPL CALCULATED.3IONS-SCNC: 9.6 MMOL/L (ref 5–15)
AORTIC DIMENSIONLESS INDEX: 0.6 (DI)
ASCENDING AORTA: 3.7 CM
AST SERPL-CCNC: 12 U/L (ref 1–40)
BASOPHILS # BLD AUTO: 0.03 10*3/MM3 (ref 0–0.2)
BASOPHILS NFR BLD AUTO: 0.4 % (ref 0–1.5)
BH CV ECHO MEAS - AI P1/2T: 1260 MSEC
BH CV ECHO MEAS - AO MAX PG: 8.9 MMHG
BH CV ECHO MEAS - AO MEAN PG: 4.7 MMHG
BH CV ECHO MEAS - AO ROOT DIAM: 3.1 CM
BH CV ECHO MEAS - AO V2 MAX: 149.4 CM/SEC
BH CV ECHO MEAS - AO V2 VTI: 25.8 CM
BH CV ECHO MEAS - AVA(I,D): 3 CM2
BH CV ECHO MEAS - EDV(CUBED): 117.6 ML
BH CV ECHO MEAS - EDV(MOD-SP2): 104 ML
BH CV ECHO MEAS - EDV(MOD-SP4): 142 ML
BH CV ECHO MEAS - EF(MOD-BP): 55.1 %
BH CV ECHO MEAS - EF(MOD-SP2): 52.9 %
BH CV ECHO MEAS - EF(MOD-SP4): 58.5 %
BH CV ECHO MEAS - ESV(CUBED): 40.1 ML
BH CV ECHO MEAS - ESV(MOD-SP2): 49 ML
BH CV ECHO MEAS - ESV(MOD-SP4): 59 ML
BH CV ECHO MEAS - FS: 30.1 %
BH CV ECHO MEAS - IVS/LVPW: 0.75 CM
BH CV ECHO MEAS - IVSD: 1.2 CM
BH CV ECHO MEAS - LAT PEAK E' VEL: 11.2 CM/SEC
BH CV ECHO MEAS - LV MASS(C)D: 282.6 GRAMS
BH CV ECHO MEAS - LV MAX PG: 2.6 MMHG
BH CV ECHO MEAS - LV MEAN PG: 1.69 MMHG
BH CV ECHO MEAS - LV V1 MAX: 80.6 CM/SEC
BH CV ECHO MEAS - LV V1 VTI: 16.6 CM
BH CV ECHO MEAS - LVIDD: 4.9 CM
BH CV ECHO MEAS - LVIDS: 3.4 CM
BH CV ECHO MEAS - LVOT AREA: 4.6 CM2
BH CV ECHO MEAS - LVOT DIAM: 2.42 CM
BH CV ECHO MEAS - LVPWD: 1.6 CM
BH CV ECHO MEAS - MED PEAK E' VEL: 10.8 CM/SEC
BH CV ECHO MEAS - MV DEC SLOPE: 651.9 CM/SEC2
BH CV ECHO MEAS - MV DEC TIME: 0.14 MSEC
BH CV ECHO MEAS - MV E MAX VEL: 98.5 CM/SEC
BH CV ECHO MEAS - MV MAX PG: 5.7 MMHG
BH CV ECHO MEAS - MV MEAN PG: 2.29 MMHG
BH CV ECHO MEAS - MV P1/2T: 57.4 MSEC
BH CV ECHO MEAS - MV V2 VTI: 22 CM
BH CV ECHO MEAS - MVA(P1/2T): 3.8 CM2
BH CV ECHO MEAS - MVA(VTI): 3.5 CM2
BH CV ECHO MEAS - PA V2 MAX: 76 CM/SEC
BH CV ECHO MEAS - PULM DIAS VEL: 60.9 CM/SEC
BH CV ECHO MEAS - PULM S/D: 0.45
BH CV ECHO MEAS - PULM SYS VEL: 27.6 CM/SEC
BH CV ECHO MEAS - RVOT DIAM: 2.6 CM
BH CV ECHO MEAS - SV(LVOT): 76.5 ML
BH CV ECHO MEAS - SV(MOD-SP2): 55 ML
BH CV ECHO MEAS - SV(MOD-SP4): 83 ML
BH CV ECHO MEASUREMENTS AVERAGE E/E' RATIO: 8.95
BH CV XLRA - RV BASE: 3.3 CM
BH CV XLRA - RV LENGTH: 8.2 CM
BH CV XLRA - RV MID: 2.8 CM
BH CV XLRA - TDI S': 11.1 CM/SEC
BILIRUB SERPL-MCNC: 0.9 MG/DL (ref 0–1.2)
BUN SERPL-MCNC: 20 MG/DL (ref 8–23)
BUN/CREAT SERPL: 11.8 (ref 7–25)
CALCIUM SPEC-SCNC: 8.4 MG/DL (ref 8.6–10.5)
CHLORIDE SERPL-SCNC: 104 MMOL/L (ref 98–107)
CHOLEST SERPL-MCNC: 116 MG/DL (ref 0–200)
CO2 SERPL-SCNC: 24.4 MMOL/L (ref 22–29)
CREAT SERPL-MCNC: 1.7 MG/DL (ref 0.76–1.27)
DEPRECATED RDW RBC AUTO: 45.9 FL (ref 37–54)
EGFRCR SERPLBLD CKD-EPI 2021: 42.3 ML/MIN/1.73
EOSINOPHIL # BLD AUTO: 0.38 10*3/MM3 (ref 0–0.4)
EOSINOPHIL NFR BLD AUTO: 5.4 % (ref 0.3–6.2)
ERYTHROCYTE [DISTWIDTH] IN BLOOD BY AUTOMATED COUNT: 13.6 % (ref 12.3–15.4)
GLOBULIN UR ELPH-MCNC: 3 GM/DL
GLUCOSE SERPL-MCNC: 219 MG/DL (ref 65–99)
HBA1C MFR BLD: 7.7 % (ref 4.8–5.6)
HCT VFR BLD AUTO: 33.9 % (ref 37.5–51)
HDLC SERPL-MCNC: 36 MG/DL (ref 40–60)
HGB BLD-MCNC: 11 G/DL (ref 13–17.7)
IMM GRANULOCYTES # BLD AUTO: 0.05 10*3/MM3 (ref 0–0.05)
IMM GRANULOCYTES NFR BLD AUTO: 0.7 % (ref 0–0.5)
LDLC SERPL CALC-MCNC: 65 MG/DL (ref 0–100)
LDLC/HDLC SERPL: 1.81 {RATIO}
LEFT ATRIUM VOLUME INDEX: 50.2 ML/M2
LYMPHOCYTES # BLD AUTO: 1.03 10*3/MM3 (ref 0.7–3.1)
LYMPHOCYTES NFR BLD AUTO: 14.6 % (ref 19.6–45.3)
MCH RBC QN AUTO: 29.7 PG (ref 26.6–33)
MCHC RBC AUTO-ENTMCNC: 32.4 G/DL (ref 31.5–35.7)
MCV RBC AUTO: 91.6 FL (ref 79–97)
MONOCYTES # BLD AUTO: 0.65 10*3/MM3 (ref 0.1–0.9)
MONOCYTES NFR BLD AUTO: 9.2 % (ref 5–12)
NEUTROPHILS NFR BLD AUTO: 4.93 10*3/MM3 (ref 1.7–7)
NEUTROPHILS NFR BLD AUTO: 69.7 % (ref 42.7–76)
NRBC BLD AUTO-RTO: 0 /100 WBC (ref 0–0.2)
PLATELET # BLD AUTO: 106 10*3/MM3 (ref 140–450)
PMV BLD AUTO: 12.2 FL (ref 6–12)
POTASSIUM SERPL-SCNC: 3.7 MMOL/L (ref 3.5–5.2)
PROT SERPL-MCNC: 6.2 G/DL (ref 6–8.5)
RBC # BLD AUTO: 3.7 10*6/MM3 (ref 4.14–5.8)
SINUS: 3.4 CM
SODIUM SERPL-SCNC: 138 MMOL/L (ref 136–145)
STJ: 2.6 CM
TRIGL SERPL-MCNC: 74 MG/DL (ref 0–150)
TSH SERPL DL<=0.05 MIU/L-ACNC: 1.7 UIU/ML (ref 0.27–4.2)
VLDLC SERPL-MCNC: 15 MG/DL (ref 5–40)
WBC NRBC COR # BLD: 7.07 10*3/MM3 (ref 3.4–10.8)

## 2023-08-16 PROCEDURE — 84443 ASSAY THYROID STIM HORMONE: CPT | Performed by: HOSPITALIST

## 2023-08-16 PROCEDURE — 83036 HEMOGLOBIN GLYCOSYLATED A1C: CPT | Performed by: HOSPITALIST

## 2023-08-16 PROCEDURE — 80053 COMPREHEN METABOLIC PANEL: CPT | Performed by: HOSPITALIST

## 2023-08-16 PROCEDURE — 93306 TTE W/DOPPLER COMPLETE: CPT | Performed by: INTERNAL MEDICINE

## 2023-08-16 PROCEDURE — 93306 TTE W/DOPPLER COMPLETE: CPT

## 2023-08-16 PROCEDURE — 85025 COMPLETE CBC W/AUTO DIFF WBC: CPT | Performed by: HOSPITALIST

## 2023-08-16 PROCEDURE — 25010000002 PIPERACILLIN SOD-TAZOBACTAM PER 1 G: Performed by: HOSPITALIST

## 2023-08-16 PROCEDURE — 80061 LIPID PANEL: CPT | Performed by: HOSPITALIST

## 2023-08-16 RX ORDER — AMLODIPINE BESYLATE 5 MG/1
5 TABLET ORAL
Status: DISCONTINUED | OUTPATIENT
Start: 2023-08-16 | End: 2023-08-18 | Stop reason: HOSPADM

## 2023-08-16 RX ADMIN — APIXABAN 5 MG: 5 TABLET, FILM COATED ORAL at 08:05

## 2023-08-16 RX ADMIN — SODIUM CHLORIDE 50 ML/HR: 9 INJECTION, SOLUTION INTRAVENOUS at 23:18

## 2023-08-16 RX ADMIN — FAMOTIDINE 20 MG: 20 TABLET, FILM COATED ORAL at 20:26

## 2023-08-16 RX ADMIN — PIPERACILLIN SODIUM AND TAZOBACTAM SODIUM 3.38 G: 3; .375 INJECTION, SOLUTION INTRAVENOUS at 06:23

## 2023-08-16 RX ADMIN — PIPERACILLIN SODIUM AND TAZOBACTAM SODIUM 3.38 G: 3; .375 INJECTION, SOLUTION INTRAVENOUS at 14:44

## 2023-08-16 RX ADMIN — SODIUM CHLORIDE 75 ML/HR: 9 INJECTION, SOLUTION INTRAVENOUS at 08:07

## 2023-08-16 RX ADMIN — APIXABAN 5 MG: 5 TABLET, FILM COATED ORAL at 20:26

## 2023-08-16 RX ADMIN — AMLODIPINE BESYLATE 5 MG: 5 TABLET ORAL at 17:40

## 2023-08-16 RX ADMIN — GUAIFENESIN 600 MG: 600 TABLET, EXTENDED RELEASE ORAL at 20:26

## 2023-08-16 RX ADMIN — ASPIRIN 81 MG: 81 TABLET, COATED ORAL at 08:05

## 2023-08-16 RX ADMIN — GUAIFENESIN 600 MG: 600 TABLET, EXTENDED RELEASE ORAL at 08:05

## 2023-08-16 RX ADMIN — ATORVASTATIN CALCIUM 10 MG: 20 TABLET, FILM COATED ORAL at 20:26

## 2023-08-16 RX ADMIN — FAMOTIDINE 20 MG: 20 TABLET, FILM COATED ORAL at 08:05

## 2023-08-16 RX ADMIN — ALLOPURINOL 100 MG: 100 TABLET ORAL at 08:05

## 2023-08-16 RX ADMIN — PIPERACILLIN SODIUM AND TAZOBACTAM SODIUM 3.38 G: 3; .375 INJECTION, SOLUTION INTRAVENOUS at 23:17

## 2023-08-16 RX ADMIN — CARVEDILOL 12.5 MG: 12.5 TABLET, FILM COATED ORAL at 08:05

## 2023-08-16 RX ADMIN — CARVEDILOL 12.5 MG: 12.5 TABLET, FILM COATED ORAL at 17:40

## 2023-08-16 NOTE — PLAN OF CARE
Goal Outcome Evaluation:VS stable , no complaints made. Incontinent keep dry and comfortable. Still for 2D echo. A fib on the monitor with eloquis. Will continue to monitor.

## 2023-08-16 NOTE — PLAN OF CARE
Goal Outcome Evaluation:      74 yo male admitted 8/14 with aspiration pneumonia. Pt has congested cough, no SOA, not requiring O2 supplementation. Echo done today. IV zosyn. VS stable, room air, disoriented to time and situation. Hx dementia.

## 2023-08-16 NOTE — PROGRESS NOTES
"Daily progress note    Primary care physician  Dr. Bauman    Subjective  Awake and alert and feeling better with no new complaints and making good progress.    History of present illness  73-year-old white male with multiple medical problems including chronic atrial fibrillation congestive heart failure chronic kidney disease stage II CVA with right hemiparesis diabetes mellitus hypertension hyperlipidemia who is a nursing home resident presented to [facility] emergency room with shortness of breath started yesterday.  Patient apparently had a syncopal episode at nursing home.  Patient evaluated in ER found to have pneumonia with acute hypoxic respiratory failure admit for management.  Patient did have nausea vomiting and apparently aspirated with no coffee-ground emesis.  At the time of entry was awake and alert passed the swallow study and eating and feeling better.     REVIEW OF SYSTEMS  All systems reviewed and negative except for those discussed in HPI.      PHYSICAL EXAM   Blood pressure 151/95, pulse 86, temperature 98.4 øF (36.9 øC), temperature source Oral, resp. rate 18, height 200.7 cm (79.02\"), weight 112 kg (246 lb 14.6 oz), SpO2 94 %.    GENERAL: Awake and alert in no distress  HEENT: Unremarkable  NECK: Supple  CV: Irregularly irregular S1-S2  RESPIRATORY: Mild respiratory distress with rhonchi in all lung fields and upper airway noises  ABDOMEN: softly distended but nontender with diminished bowel sounds  MUSCULOSKELETAL: no deformity  NEURO: Awake with aphasia and right-sided weakness from prior stroke  PSYCH:  calm, cooperative  SKIN: warm, dry     LAB RESULTS  Lab Results (last 24 hours)       Procedure Component Value Units Date/Time    CBC & Differential [[id number]]  (Abnormal) Collected: 08/16/23 0430    Specimen: Blood Updated: 08/16/23 0606    Narrative:      The following orders were created for panel order CBC & Differential.  Procedure                               Abnormality   "       Status                     ---------                               -----------         ------                     CBC Auto Differential[[id number]]        Abnormal            Final result                 Please view results for these tests on the individual orders.    CBC Auto Differential [[id number]]  (Abnormal) Collected: 08/16/23 0430    Specimen: Blood Updated: 08/16/23 0606     WBC 7.07 10*3/mm3      RBC 3.70 10*6/mm3      Hemoglobin 11.0 g/dL      Hematocrit 33.9 %      MCV 91.6 fL      MCH 29.7 pg      MCHC 32.4 g/dL      RDW 13.6 %      RDW-SD 45.9 fl      MPV 12.2 fL      Platelets 106 10*3/mm3      Neutrophil % 69.7 %      Lymphocyte % 14.6 %      Monocyte % 9.2 %      Eosinophil % 5.4 %      Basophil % 0.4 %      Immature Grans % 0.7 %      Neutrophils, Absolute 4.93 10*3/mm3      Lymphocytes, Absolute 1.03 10*3/mm3      Monocytes, Absolute 0.65 10*3/mm3      Eosinophils, Absolute 0.38 10*3/mm3      Basophils, Absolute 0.03 10*3/mm3      Immature Grans, Absolute 0.05 10*3/mm3      nRBC 0.0 /100 WBC     TSH [[id number]]  (Normal) Collected: 08/16/23 0430    Specimen: Blood Updated: 08/16/23 0537     TSH 1.700 uIU/mL     Lipid Panel [[id number]]  (Abnormal) Collected: 08/16/23 0430    Specimen: Blood Updated: 08/16/23 0531     Total Cholesterol 116 mg/dL      Triglycerides 74 mg/dL      HDL Cholesterol 36 mg/dL      LDL Cholesterol  65 mg/dL      VLDL Cholesterol 15 mg/dL      LDL/HDL Ratio 1.81    Narrative:      Cholesterol Reference Ranges  (U.S. Department of Health and Human Services ATP III Classifications)    Desirable          <200 mg/dL  Borderline High    200-239 mg/dL  High Risk          >240 mg/dL      Triglyceride Reference Ranges  (U.S. Department of Health and Human Services ATP III Classifications)    Normal           <150 mg/dL  Borderline High  150-199 mg/dL  High             200-499 mg/dL  Very High        >500 mg/dL    HDL Reference Ranges  (U.S. Department of Health and Human  Services ATP III Classifications)    Low     <40 mg/dl (major risk factor for CHD)  High    >60 mg/dl ('negative' risk factor for CHD)        LDL Reference Ranges  (U.S. Department of Health and Human Services ATP III Classifications)    Optimal          <100 mg/dL  Near Optimal     100-129 mg/dL  Borderline High  130-159 mg/dL  High             160-189 mg/dL  Very High        >189 mg/dL    Comprehensive Metabolic Panel [[id number]]  (Abnormal) Collected: 08/16/23 0430    Specimen: Blood Updated: 08/16/23 0531     Glucose 219 mg/dL      BUN 20 mg/dL      Creatinine 1.70 mg/dL      Sodium 138 mmol/L      Potassium 3.7 mmol/L      Chloride 104 mmol/L      CO2 24.4 mmol/L      Calcium 8.4 mg/dL      Total Protein 6.2 g/dL      Albumin 3.2 g/dL      ALT (SGPT) 8 U/L      AST (SGOT) 12 U/L      Alkaline Phosphatase 55 U/L      Total Bilirubin 0.9 mg/dL      Globulin 3.0 gm/dL      A/G Ratio 1.1 g/dL      BUN/Creatinine Ratio 11.8     Anion Gap 9.6 mmol/L      eGFR 42.3 mL/min/1.73     Narrative:      GFR Normal >60  Chronic Kidney Disease <60  Kidney Failure <15    The GFR formula is only valid for adults with stable renal function between ages 18 and 70.    Hemoglobin A1c [[id number]]  (Abnormal) Collected: 08/16/23 0430    Specimen: Blood Updated: 08/16/23 0526     Hemoglobin A1C 7.70 %     Narrative:      Hemoglobin A1C Ranges:    Increased Risk for Diabetes  5.7% to 6.4%  Diabetes                     >= 6.5%  Diabetic Goal                < 7.0%    Blood Culture - Blood, Arm, Left [[id number]]  (Normal) Collected: 08/14/23 1503    Specimen: Blood from Arm, Left Updated: 08/15/23 1515     Blood Culture No growth at 24 hours    Blood Culture - Blood, Arm, Right [[id number]]  (Normal) Collected: 08/14/23 1443    Specimen: Blood from Arm, Right Updated: 08/15/23 1501     Blood Culture No growth at 24 hours          Imaging Results (Last 24 Hours)       ** No results found for the last 24 hours. **          Results    Scan on  8/14/2023 1424 by [employer] [employer]: ECG 12-LEAD         Author: -- Service: -- Author Type: --   Filed: Date of Service: Creation Time:   Status: (Other)   HEART RATE= 78  bpm  RR Interval= 769  ms  TX Interval=   ms  P Horizontal Axis=   deg  P Front Axis=   deg  QRSD Interval= 89  ms  QT Interval= 399  ms  QRS Axis= -16  deg  T Wave Axis= 54  deg  - ABNORMAL ECG -  Atrial fibrillation  Borderline left axis deviation  Borderline low voltage, extremity leads  When compared with ECG of 19-May-2019 14:29:40,  Significant rate decrease otherwise no change          Current Facility-Administered Medications:     acetaminophen (TYLENOL) 160 MG/5ML solution 650 mg, 650 mg, Oral, Q6H PRN, Bernard Paz MD    allopurinol (ZYLOPRIM) tablet 100 mg, 100 mg, Oral, Daily, Bernard Paz MD, 100 mg at 08/16/23 0805    apixaban (ELIQUIS) tablet 5 mg, 5 mg, Oral, Q12H, Bernard Paz MD, 5 mg at 08/16/23 0805    aspirin EC tablet 81 mg, 81 mg, Oral, Daily, Bernard Paz MD, 81 mg at 08/16/23 0805    atorvastatin (LIPITOR) tablet 10 mg, 10 mg, Oral, Nightly, Bernard Paz MD, 10 mg at 08/15/23 2051    carvedilol (COREG) tablet 12.5 mg, 12.5 mg, Oral, BID With Meals, Bernard Paz MD, 12.5 mg at 08/16/23 0805    famotidine (PEPCID) tablet 20 mg, 20 mg, Oral, BID, Bernard Paz MD, 20 mg at 08/16/23 0805    guaiFENesin (MUCINEX) 12 hr tablet 600 mg, 600 mg, Oral, Q12H, Bernard Paz MD, 600 mg at 08/16/23 0805    ipratropium-albuterol (DUO-NEB) nebulizer solution 3 mL, 3 mL, Nebulization, Q4H PRN, Bernard Paz MD    piperacillin-tazobactam (ZOSYN) 3.375 g in iso-osmotic dextrose 50 ml (premix), 3.375 g, Intravenous, Q8H, Bernard Paz MD, Last Rate: 12.5 mL/hr at 08/16/23 0623, 3.375 g at 08/16/23 1444    [COMPLETED] Insert Peripheral IV, , , Once **AND** sodium chloride 0.9 % flush 10 mL, 10 mL, Intravenous, PRN, Usman Naylor MD    sodium chloride 0.9 % infusion, 75 mL/hr, Intravenous, Continuous, Bernard Paz MD, Last Rate: 75  mL/hr at 08/16/23 0807, 75 mL/hr at 08/16/23 0807     ASSESSMENT  Acute hypoxic respiratory failure  Bilateral pneumonia   Chronic atrial fibrillation  Congestive heart failure  Diabetes mellitus  Hypertension  Hyperlipidemia  History of CVA with right hemiparesis  Chronic kidney disease stage II  Gastroesophageal reflux disease    PLAN  CPM  Supplemental oxygen nebulizer  Continue IV antibiotics  Adjust nursing home medications  Supportive care  PT OT  Discussed with nursing staff  Follow closely further recommendation current hospital course    KARTHIK COSBY MD    Copied text in this note has been reviewed and is accurate as of 08/16/23

## 2023-08-16 NOTE — CASE MANAGEMENT/SOCIAL WORK
Continued Stay Note  [facility]     Patient Name: Efra Tam  MRN: [id number]  Today's Date: 8/16/2023    Admit Date: 8/14/2023    Plan: Return to [facility] with bed hold. Will need ambulance transport at D/C.   Discharge Plan       Row Name 08/16/23 1548       Plan    Plan Return to [facility] with bed hold. Will need ambulance transport at D/C.    Patient/Family in Agreement with Plan yes    Plan Comments Called and spoke to Tana, pt's daughter, and verified pt is to return to [facility] at D/C. Needs amb transport at D/C. Smooth Arenas RN-BC                   Discharge Codes    No documentation.                 Expected Discharge Date and Time       Expected Discharge Date Expected Discharge Time    Aug 18, 2023               Smooth Arenas RN

## 2023-08-17 LAB
ANION GAP SERPL CALCULATED.3IONS-SCNC: 7.1 MMOL/L (ref 5–15)
BASOPHILS # BLD AUTO: 0.04 10*3/MM3 (ref 0–0.2)
BASOPHILS NFR BLD AUTO: 0.6 % (ref 0–1.5)
BUN SERPL-MCNC: 14 MG/DL (ref 8–23)
BUN/CREAT SERPL: 9.6 (ref 7–25)
CALCIUM SPEC-SCNC: 8.3 MG/DL (ref 8.6–10.5)
CHLORIDE SERPL-SCNC: 106 MMOL/L (ref 98–107)
CO2 SERPL-SCNC: 25.9 MMOL/L (ref 22–29)
CREAT SERPL-MCNC: 1.46 MG/DL (ref 0.76–1.27)
DEPRECATED RDW RBC AUTO: 44.6 FL (ref 37–54)
EGFRCR SERPLBLD CKD-EPI 2021: 50.8 ML/MIN/1.73
EOSINOPHIL # BLD AUTO: 0.41 10*3/MM3 (ref 0–0.4)
EOSINOPHIL NFR BLD AUTO: 6.3 % (ref 0.3–6.2)
ERYTHROCYTE [DISTWIDTH] IN BLOOD BY AUTOMATED COUNT: 13.8 % (ref 12.3–15.4)
GLUCOSE SERPL-MCNC: 186 MG/DL (ref 65–99)
HCT VFR BLD AUTO: 32.4 % (ref 37.5–51)
HGB BLD-MCNC: 10.6 G/DL (ref 13–17.7)
LYMPHOCYTES # BLD AUTO: 1.06 10*3/MM3 (ref 0.7–3.1)
LYMPHOCYTES NFR BLD AUTO: 16.2 % (ref 19.6–45.3)
MCH RBC QN AUTO: 29.1 PG (ref 26.6–33)
MCHC RBC AUTO-ENTMCNC: 32.7 G/DL (ref 31.5–35.7)
MCV RBC AUTO: 89 FL (ref 79–97)
MONOCYTES # BLD AUTO: 0.53 10*3/MM3 (ref 0.1–0.9)
MONOCYTES NFR BLD AUTO: 8.1 % (ref 5–12)
NEUTROPHILS NFR BLD AUTO: 4.47 10*3/MM3 (ref 1.7–7)
NEUTROPHILS NFR BLD AUTO: 68 % (ref 42.7–76)
PLATELET # BLD AUTO: 109 10*3/MM3 (ref 140–450)
PMV BLD AUTO: 11.7 FL (ref 6–12)
POTASSIUM SERPL-SCNC: 3.7 MMOL/L (ref 3.5–5.2)
RBC # BLD AUTO: 3.64 10*6/MM3 (ref 4.14–5.8)
SODIUM SERPL-SCNC: 139 MMOL/L (ref 136–145)
WBC NRBC COR # BLD: 6.56 10*3/MM3 (ref 3.4–10.8)

## 2023-08-17 PROCEDURE — 97166 OT EVAL MOD COMPLEX 45 MIN: CPT

## 2023-08-17 PROCEDURE — 80048 BASIC METABOLIC PNL TOTAL CA: CPT | Performed by: HOSPITALIST

## 2023-08-17 PROCEDURE — 97162 PT EVAL MOD COMPLEX 30 MIN: CPT

## 2023-08-17 PROCEDURE — 97530 THERAPEUTIC ACTIVITIES: CPT

## 2023-08-17 PROCEDURE — 85025 COMPLETE CBC W/AUTO DIFF WBC: CPT | Performed by: HOSPITALIST

## 2023-08-17 PROCEDURE — 25010000002 PIPERACILLIN SOD-TAZOBACTAM PER 1 G: Performed by: HOSPITALIST

## 2023-08-17 PROCEDURE — 92526 ORAL FUNCTION THERAPY: CPT

## 2023-08-17 RX ORDER — AMLODIPINE BESYLATE 5 MG/1
5 TABLET ORAL
Qty: 30 TABLET | Refills: 0 | Status: SHIPPED | OUTPATIENT
Start: 2023-08-18 | End: 2023-09-17

## 2023-08-17 RX ORDER — FAMOTIDINE 20 MG/1
20 TABLET, FILM COATED ORAL 2 TIMES DAILY
Qty: 60 TABLET | Refills: 0 | Status: SHIPPED | OUTPATIENT
Start: 2023-08-17 | End: 2023-09-16

## 2023-08-17 RX ORDER — AMOXICILLIN AND CLAVULANATE POTASSIUM 875; 125 MG/1; MG/1
1 TABLET, FILM COATED ORAL EVERY 12 HOURS SCHEDULED
Qty: 10 TABLET | Refills: 0 | Status: SHIPPED | OUTPATIENT
Start: 2023-08-17 | End: 2023-08-22

## 2023-08-17 RX ORDER — AMOXICILLIN AND CLAVULANATE POTASSIUM 875; 125 MG/1; MG/1
1 TABLET, FILM COATED ORAL EVERY 12 HOURS SCHEDULED
Status: DISCONTINUED | OUTPATIENT
Start: 2023-08-17 | End: 2023-08-18 | Stop reason: HOSPADM

## 2023-08-17 RX ORDER — CARVEDILOL 12.5 MG/1
12.5 TABLET ORAL 2 TIMES DAILY WITH MEALS
Qty: 60 TABLET | Refills: 0 | Status: SHIPPED | OUTPATIENT
Start: 2023-08-17 | End: 2023-09-16

## 2023-08-17 RX ORDER — GUAIFENESIN 600 MG/1
600 TABLET, EXTENDED RELEASE ORAL EVERY 12 HOURS SCHEDULED
Qty: 60 TABLET | Refills: 0 | Status: SHIPPED | OUTPATIENT
Start: 2023-08-17 | End: 2023-09-16

## 2023-08-17 RX ADMIN — CARVEDILOL 12.5 MG: 12.5 TABLET, FILM COATED ORAL at 09:39

## 2023-08-17 RX ADMIN — ATORVASTATIN CALCIUM 10 MG: 20 TABLET, FILM COATED ORAL at 20:43

## 2023-08-17 RX ADMIN — AMOXICILLIN AND CLAVULANATE POTASSIUM 1 TABLET: 875; 125 TABLET, FILM COATED ORAL at 20:43

## 2023-08-17 RX ADMIN — PIPERACILLIN SODIUM AND TAZOBACTAM SODIUM 3.38 G: 3; .375 INJECTION, SOLUTION INTRAVENOUS at 05:59

## 2023-08-17 RX ADMIN — ALLOPURINOL 100 MG: 100 TABLET ORAL at 09:39

## 2023-08-17 RX ADMIN — ASPIRIN 81 MG: 81 TABLET, COATED ORAL at 09:39

## 2023-08-17 RX ADMIN — AMLODIPINE BESYLATE 5 MG: 5 TABLET ORAL at 09:39

## 2023-08-17 RX ADMIN — GUAIFENESIN 600 MG: 600 TABLET, EXTENDED RELEASE ORAL at 20:43

## 2023-08-17 RX ADMIN — FAMOTIDINE 20 MG: 20 TABLET, FILM COATED ORAL at 09:39

## 2023-08-17 RX ADMIN — APIXABAN 5 MG: 5 TABLET, FILM COATED ORAL at 20:43

## 2023-08-17 RX ADMIN — AMOXICILLIN AND CLAVULANATE POTASSIUM 1 TABLET: 875; 125 TABLET, FILM COATED ORAL at 14:25

## 2023-08-17 RX ADMIN — GUAIFENESIN 600 MG: 600 TABLET, EXTENDED RELEASE ORAL at 09:39

## 2023-08-17 RX ADMIN — FAMOTIDINE 20 MG: 20 TABLET, FILM COATED ORAL at 20:43

## 2023-08-17 RX ADMIN — APIXABAN 5 MG: 5 TABLET, FILM COATED ORAL at 09:39

## 2023-08-17 RX ADMIN — CARVEDILOL 12.5 MG: 12.5 TABLET, FILM COATED ORAL at 18:16

## 2023-08-17 NOTE — DISCHARGE SUMMARY
Discharge summary    Date of admission 8/14/2023  Date of discharge 8/18/2023    Final diagnosis  Acute hypoxic respiratory failure resolved  Bilateral pneumonia resolving  Chronic atrial fibrillation  Congestive heart failure  Diabetes mellitus  Hypertension  Hyperlipidemia  History of CVA with right hemiparesis  Chronic kidney disease stage II  Gastroesophageal reflux disease    Discharge medications    Current Facility-Administered Medications:     acetaminophen (TYLENOL) 160 MG/5ML solution 650 mg, 650 mg, Oral, Q6H PRN, Bernard Paz MD    allopurinol (ZYLOPRIM) tablet 100 mg, 100 mg, Oral, Daily, Bernard Paz MD, 100 mg at 08/17/23 0939    amLODIPine (NORVASC) tablet 5 mg, 5 mg, Oral, Q24H, Bernard Paz MD, 5 mg at 08/17/23 0939    amoxicillin-clavulanate (AUGMENTIN) 875-125 MG per tablet 1 tablet, 1 tablet, Oral, Q12H, Bernard Paz MD    apixaban (ELIQUIS) tablet 5 mg, 5 mg, Oral, Q12H, Bernard Paz MD, 5 mg at 08/17/23 0939    aspirin EC tablet 81 mg, 81 mg, Oral, Daily, Bernard Paz MD, 81 mg at 08/17/23 0939    atorvastatin (LIPITOR) tablet 10 mg, 10 mg, Oral, Nightly, Bernard Paz MD, 10 mg at 08/16/23 2026    carvedilol (COREG) tablet 12.5 mg, 12.5 mg, Oral, BID With Meals, Bernard Paz MD, 12.5 mg at 08/17/23 0939    famotidine (PEPCID) tablet 20 mg, 20 mg, Oral, BID, Bernard Paz MD, 20 mg at 08/17/23 0939    guaiFENesin (MUCINEX) 12 hr tablet 600 mg, 600 mg, Oral, Q12H, Bernard Paz MD, 600 mg at 08/17/23 0939    [COMPLETED] Insert Peripheral IV, , , Once **AND** sodium chloride 0.9 % flush 10 mL, 10 mL, Intravenous, PRN, Usman Naylor MD     Consults obtained  None    Procedures  None    Hospital course  73-year-old white male with multiple medical problems admitted to emergency room with shortness of breath.  Patient work-up in ER revealed acute hypoxic respiratory failure with bilateral pneumonia probably aspiration admit for management.  Patient admitted treated with supplemental  oxygen nebulizer empiric antibiotics and further evaluated by speech pathology and he passed the swallow study.  Patient started on oral diet and his cultures remains negative and antibiotics changed to by mouth and he can finish remaining dose of antibiotics at nursing home.  Patient is off oxygen and his oxygen saturation 96% room air.  Patient remain immobilized and his blood pressure medication adjusted during this hospitalization.    Discharge diet regular    Activity per PT OT    Medications as above    Further care per accepting physician at nursing home and take medication as directed.    KARTHIK COSBY MD

## 2023-08-17 NOTE — THERAPY EVALUATION
Acute Care - Speech Language Pathology   Swallow Initial Evaluation [facility]     Patient Name: Efra Tam  : 1950  MRN: [id number]  Today's Date: 2023               Admit Date: 2023    Visit Dx:     ICD-10-CM ICD-9-CM   1. Syncope and collapse  R55 780.2   2. Acute respiratory failure with hypoxia  J96.01 518.81   3. Aspiration pneumonia of both lungs, unspecified aspiration pneumonia type, unspecified part of lung  J69.0 507.0   4. History of stroke  Z86.73 V12.54   5. History of atrial fibrillation  Z86.79 V12.59     Patient Active Problem List   Diagnosis    ANIBAL (acute kidney injury)    Stroke    Hemiparesis    Hypertension    Chronic systolic CHF (congestive heart failure)    Atrial fibrillation    Combined receptive and expressive 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    Type 2 diabetes mellitus with renal complication    Dehydration    Metabolic encephalopathy    Hypoxia    CKD (chronic kidney disease) stage 2, GFR 60-89 ml/min    Sepsis secondary to UTI    Aspiration pneumonia     Past Medical History:   Diagnosis Date    Atrial fibrillation     Chronic systolic CHF (congestive heart failure)     Combined receptive and expressive 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     Gout     Hemiparesis     R sided    Hyperlipidemia     Hypertension     Stroke      Past Surgical History:   Procedure Laterality Date    FRACTURE SURGERY         SLP Recommendation and Plan                                                                                         SWALLOW EVALUATION (last 72 hours)       SLP Adult Swallow Evaluation       Row Name 08/15/23 1000                   Rehab Evaluation    Document Type evaluation  -AW        Subjective Information no complaints  -AW        Patient Observations alert;cooperative;agree to therapy  -AW        Patient/Family/Caregiver Comments/Observations Pt with mild aphasia from h/o CVA. Pt can make needs known and follow commands.  -AW        Patient Effort good  -AW         Symptoms Noted During/After Treatment none  -AW           General Information    Patient Profile Reviewed yes  -AW        Pertinent History Of Current Problem Pt admitted from NH with syncope, collapse, vomitting, and aspiration PNA.  -AW        Current Method of Nutrition NPO  -AW        Precautions/Limitations, Vision WFL;for purposes of eval  -AW        Precautions/Limitations, Hearing WFL;for purposes of eval  -AW        Prior Level of Function-Communication expressive language impairment  -AW        Prior Level of Function-Swallowing no diet consistency restrictions  -AW        Plans/Goals Discussed with patient;agreed upon  -AW        Barriers to Rehab none identified  -AW        Patient's Goals for Discharge return to PO diet  -AW           Pain    Additional Documentation Pain Scale: Numbers Pre/Post-Treatment (Group)  -AW           Pain Scale: Numbers Pre/Post-Treatment    Pretreatment Pain Rating 0/10 - no pain  -AW        Posttreatment Pain Rating 0/10 - no pain  -AW           Oral Motor Structure and Function    Dentition Assessment natural, present and adequate  -AW        Secretion Management WNL/WFL  -AW        Mucosal Quality moist, healthy  -AW        Volitional Swallow WFL  -AW           Oral Musculature and Cranial Nerve Assessment    Oral Labial or Buccal Impairment, Detail, Cranial Nerve VII (Facial): right labial droop;other (see comments)  slight  -AW           General Eating/Swallowing Observations    Respiratory Support Currently in Use room air  -AW        Eating/Swallowing Skills self-fed;fed by SLP  -AW        Positioning During Eating upright in bed  -AW        Utensils Used spoon;cup;straw  -AW        Consistencies Trialed ice chips;thin liquids;pureed;soft to chew textures;mixed consistency;regular textures  -AW           Clinical Swallow Eval    Clinical Swallow Evaluation Summary Swallow eval completed. Pt tolerated thins (cup/straw), pureed, soft, and regular coonsistencies with no  s/s. Pt coughed in 1/6 trials of mixed. Vocal quality remained clear throughout assessment. Laryngeal elevation was adequate with palpation. Recommend regular w/ thin, no mixed; meds w/ thin or pureed; upright for meals and 30 min after slow rate; small bites/sips. ST to follow for tolerance and need for instrumental.  -AW           SLP Evaluation Clinical Impression    SLP Swallowing Diagnosis R/O pharyngeal dysphagia  -AW        Functional Impact risk of aspiration/pneumonia  -AW        Rehab Potential/Prognosis, Swallowing good, to achieve stated therapy goals  -AW        Swallow Criteria for Skilled Therapeutic Interventions Met demonstrates skilled criteria  -AW           Recommendations    Therapy Frequency (Swallow) PRN  -AW        Predicted Duration Therapy Intervention (Days) until discharge  -AW        SLP Diet Recommendation regular textures;no mixed consistencies;thin liquids  -AW        Recommended Precautions and Strategies upright posture during/after eating;small bites of food and sips of liquid  -AW        Oral Care Recommendations Oral Care BID/PRN  -AW        SLP Rec. for Method of Medication Administration meds whole;with thin liquids;with puree;as tolerated  -AW        Monitor for Signs of Aspiration yes  -AW        Anticipated Discharge Disposition (SLP) extended care facility  -AW           Swallow Goals (SLP)    Swallow STGs diet tolerance goal selection (SLP)  -AW           (STG) Patient will tolerate trials of    Consistencies Trialed (Tolerate trials) regular textures;thin liquids  -AW        Desired Outcome (Tolerate trials) without signs/symptoms of aspiration  -AW        [place] (Tolerate trials) independently (over 90% accuracy)  -AW        Time Frame (Tolerate trials) by discharge  -AW                  User Key  (r) = Recorded By, (t) = Taken By, (c) = Cosigned By      Initials Name Effective Dates    AW Meri Dhillon MS [facility]-SLP 06/16/21 -                     EDUCATION  The  patient has been educated in the following areas:   Dysphagia (Swallowing Impairment) Oral Care/Hydration Modified Diet Instruction.        SLP GOALS       Row Name 08/15/23 1000             (STG) Patient will tolerate trials of    Consistencies Trialed (Tolerate trials) regular textures;thin liquids  -AW      Desired Outcome (Tolerate trials) without signs/symptoms of aspiration  -AW      [place] (Tolerate trials) independently (over 90% accuracy)  -AW      Time Frame (Tolerate trials) by discharge  -AW                User Key  (r) = Recorded By, (t) = Taken By, (c) = Cosigned By      Initials Name Provider Type    Meri Cerna MS CCC-SLP Speech and Language Pathologist                       Time Calculation:                Meri Dhillon MS CCC-SLP  8/17/2023

## 2023-08-17 NOTE — THERAPY EVALUATION
Patient Name: Efra Tam  : 1950    MRN: [id number]                              Today's Date: 2023       Admit Date: 2023    Visit Dx:     ICD-10-CM ICD-9-CM   1. Syncope and collapse  R55 780.2   2. Acute respiratory failure with hypoxia  J96.01 518.81   3. Aspiration pneumonia of both lungs, unspecified aspiration pneumonia type, unspecified part of lung  J69.0 507.0   4. History of stroke  Z86.73 V12.54   5. History of atrial fibrillation  Z86.79 V12.59     Patient Active Problem List   Diagnosis    ANIBAL (acute kidney injury)    Stroke    Hemiparesis    Hypertension    Chronic systolic CHF (congestive heart failure)    Atrial fibrillation    Combined receptive and expressive 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    Type 2 diabetes mellitus with renal complication    Dehydration    Metabolic encephalopathy    Hypoxia    CKD (chronic kidney disease) stage 2, GFR 60-89 ml/min    Sepsis secondary to UTI    Aspiration pneumonia     Past Medical History:   Diagnosis Date    Atrial fibrillation     Chronic systolic CHF (congestive heart failure)     Combined receptive and expressive 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     Gout     Hemiparesis     R sided    Hyperlipidemia     Hypertension     Stroke      Past Surgical History:   Procedure Laterality Date    FRACTURE SURGERY        General Information       Row Name 23 1052          Physical Therapy Time and Intention    Document Type evaluation  -EM     Mode of Treatment co-treatment;physical therapy;occupational therapy  -EM       Row Name 23 1052          General Information    Patient Profile Reviewed yes  -EM     Prior Level of Function --  pt reports he lives at home, up with walker for mobility. chart indicates patient is LTC resident  -EM     Existing Precautions/Restrictions fall  -EM     Barriers to Rehab previous functional deficit;cognitive status  -EM       Row Name 23 1052          Living Environment    People in Home facility resident   -EM       Row Name 08/17/23 1052          Cognition    Orientation Status (Cognition) oriented to;person  -EM       Row Name 08/17/23 1052          Safety Issues, Functional Mobility    Impairments Affecting Function (Mobility) balance;cognition;endurance/activity tolerance;postural/trunk control;strength  -EM               User Key  (r) = Recorded By, (t) = Taken By, (c) = Cosigned By      Initials Name Provider Type    Staci Calles PT Physical Therapist                   Mobility       Row Name 08/17/23 1054          Bed Mobility    Bed Mobility supine-sit;sit-supine  -EM     Supine-Sit [place] (Bed Mobility) dependent (less than 25% patient effort);2 person assist  -EM     Sit-Supine [place] (Bed Mobility) dependent (less than 25% patient effort);2 person assist  -EM     Assistive Device (Bed Mobility) head of bed elevated;draw sheet  -EM       Row Name 08/17/23 1054          Transfers    Comment, (Transfers) not tested, pt demonstrates no trunk control or ability to sit upright  -EM               User Key  (r) = Recorded By, (t) = Taken By, (c) = Cosigned By      Initials Name Provider Type    Staci Calles PT Physical Therapist                   Obj/Interventions       Row Name 08/17/23 1055          Range of Motion Comprehensive    General Range of Motion no range of motion deficits identified  -EM       Row Name 08/17/23 1055          Strength Comprehensive (MMT)    Comment, General Manual Muscle Testing (MMT) Assessment R side appears weaker than L in UEs and LEs  -EM       Row Name 08/17/23 1055          Balance    Balance Assessment sitting static balance  -EM     Static Sitting Balance maximum assist;dependent  -EM     Position, Sitting Balance unsupported;sitting edge of bed  -EM       Row Name 08/17/23 1055          Sensory Assessment (Somatosensory)    Sensory Assessment (Somatosensory) sensation intact  -EM               User Key  (r) = Recorded By, (t) = Taken By, (c)  = Cosigned By      Initials Name Provider Type    Staci Calles, PT Physical Therapist                   Goals/Plan    No documentation.                  Clinical Impression       Row Name 08/17/23 1057          Pain    Pretreatment Pain Rating 0/10 - no pain  -EM       Row Name 08/17/23 1057          Plan of Care Review    Plan of Care Reviewed With patient  -EM     Outcome Evaluation Pt is 74 yo male admitted to [facility] with syncope, MI. PMH significant for afib, CHF, DM, HTN, CKD, CVA with R hemiparesis. [facility]. Pt required max/dep A x2 to perform bed mobility, max/dep assist to sit up on EOB once positioned. Pt appears to be total care at baseline at NH with long standing mobility deficits, no indication for acute PT services. d/c plan is to return to NH.  -EM       Row Name 08/17/23 1057          Therapy Assessment/Plan (PT)    Patient/Family Therapy Goals Statement (PT) go home  -EM     Criteria for Skilled Interventions Met (PT) no;no problems identified which require skilled intervention  -EM     Therapy Frequency (PT) evaluation only  -EM       Row Name 08/17/23 1057          Positioning and Restraints    Pre-Treatment Position in bed  -EM     Post Treatment Position bed  -EM     In Bed sitting;call light within reach;notified nsg;exit alarm on  -EM               User Key  (r) = Recorded By, (t) = Taken By, (c) = Cosigned By      Initials Name Provider Type    Staci Calles, PT Physical Therapist                   Outcome Measures       Row Name 08/17/23 1100          How much help from another person do you currently need...    Turning from your back to your side while in flat bed without using bedrails? 1  -EM     Moving from lying on back to sitting on the side of a flat bed without bedrails? 1  -EM     Moving to and from a bed to a chair (including a wheelchair)? 1  -EM     Standing up from a chair using your arms (e.g., wheelchair, bedside chair)?  1  -EM     Climbing 3-5 steps with a railing? 1  -EM     To walk in hospital room? 1  -EM     AM-PAC 6 Clicks Score (PT) 6  -EM               User Key  (r) = Recorded By, (t) = Taken By, (c) = Cosigned By      Initials Name Provider Type    Staci Calles PT Physical Therapist                                 Physical Therapy Education       Title: PT OT SLP Therapies (In Progress)       Topic: Physical Therapy (In Progress)       Point: Mobility training (In Progress)       Learning Progress Summary             Patient Acceptance, E, NL by EM at 8/17/2023 1101                                         User Key       Initials Effective Dates Name Provider Type Discipline    EM 06/16/21 -  Staci Clifton PT Physical Therapist PT                  PT Recommendation and Plan     Plan of Care Reviewed With: patient  Outcome Evaluation: Pt is 72 yo male admitted to [facility] with syncope, MI. PMH significant for afib, CHF, DM, HTN, CKD, CVA with R hemiparesis. [facility] but confused. Pt required max/dep A x2 to perform bed mobility, max/dep assist to sit up on EOB once positioned. Pt appears to be total care at baseline at NH with long standing mobility deficits, no indication for acute PT services. d/c plan is to return to NH.     Time Calculation:         PT Charges       Row Name 08/17/23 1101             Time Calculation    Start Time 1028  -EM      Stop Time 1042  -EM      Time Calculation (min) 14 min  -EM      PT Received On 08/17/23  -EM                User Key  (r) = Recorded By, (t) = Taken By, (c) = Cosigned By      Initials Name Provider Type    Staci Calles PT Physical Therapist                  Therapy Charges for Today       Code Description Service Date Service Provider Modifiers Qty    43262249184  PT EVAL MOD COMPLEXITY 1 8/17/2023 Staci Clifton PT GP 1            PT G-Codes  AM-PAC 6 Clicks Score (PT): 6  PT Discharge Summary  Anticipated Discharge  Disposition (PT): extended care facility    Staci Clifton, PT  8/17/2023

## 2023-08-17 NOTE — PLAN OF CARE
Goal Outcome Evaluation:  Plan of Care Reviewed With: patient           Outcome Evaluation: Pt seen for reeval of swallow. Pt tolerated thins (straw), pureed, soft solids, mixed, and regular with no overt s/s. Mastication was functional. Vocal quality remained clear during trials. Recommend advance to regular and thins; meds as tolerated; upright for meals and 30 min after; slow rate; small bites/sips. ST to follow.

## 2023-08-17 NOTE — THERAPY TREATMENT NOTE
Acute Care - Speech Language Pathology   Swallow Treatment Note [facility]     Patient Name: Efra Tam  : 1950  MRN: [id number]  Today's Date: 2023               Admit Date: 2023    Visit Dx:     ICD-10-CM ICD-9-CM   1. Syncope and collapse  R55 780.2   2. Acute respiratory failure with hypoxia  J96.01 518.81   3. Aspiration pneumonia of both lungs, unspecified aspiration pneumonia type, unspecified part of lung  J69.0 507.0   4. History of stroke  Z86.73 V12.54   5. History of atrial fibrillation  Z86.79 V12.59     Patient Active Problem List   Diagnosis    ANIBAL (acute kidney injury)    Stroke    Hemiparesis    Hypertension    Chronic systolic CHF (congestive heart failure)    Atrial fibrillation    Combined receptive and expressive 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    Type 2 diabetes mellitus with renal complication    Dehydration    Metabolic encephalopathy    Hypoxia    CKD (chronic kidney disease) stage 2, GFR 60-89 ml/min    Sepsis secondary to UTI    Aspiration pneumonia     Past Medical History:   Diagnosis Date    Atrial fibrillation     Chronic systolic CHF (congestive heart failure)     Combined receptive and expressive 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     Gout     Hemiparesis     R sided    Hyperlipidemia     Hypertension     Stroke      Past Surgical History:   Procedure Laterality Date    FRACTURE SURGERY         SLP Recommendation and Plan                                                                                  Plan of Care Reviewed With: patient  Outcome Evaluation: Pt seen for reeval of swallow. Pt tolerated thins (straw), pureed, soft solids, mixed, and regular with no overt s/s. Mastication was functional. Vocal quality remained clear during trials. Recommend advance to regular and thins; meds as tolerated; upright for meals and 30 min after; slow rate; small bites/sips. ST to follow.      SWALLOW EVALUATION (last 72 hours)       SLP Adult Swallow Evaluation       Row Name  08/17/23 0915 08/15/23 1000                Rehab Evaluation    Document Type therapy note (daily note)  -AW evaluation  -AW       Subjective Information no complaints  -AW no complaints  -AW       Patient Observations alert;cooperative;agree to therapy  -AW alert;cooperative;agree to therapy  -AW       Patient/Family/Caregiver Comments/Observations -- Pt with mild aphasia from h/o CVA. Pt can make needs known and follow commands.  -AW       Patient Effort good  -AW good  -AW       Symptoms Noted During/After Treatment none  -AW none  -AW          General Information    Patient Profile Reviewed -- yes  -AW       Pertinent History Of Current Problem -- Pt admitted from NH with syncope, collapse, vomitting, and aspiration PNA.  -AW       Current Method of Nutrition -- NPO  -AW       Precautions/Limitations, Vision -- WFL;for purposes of eval  -AW       Precautions/Limitations, Hearing -- WFL;for purposes of eval  -AW       Prior Level of Function-Communication -- expressive language impairment  -AW       Prior Level of Function-Swallowing -- no diet consistency restrictions  -AW       Plans/Goals Discussed with -- patient;agreed upon  -AW       Barriers to Rehab -- none identified  -AW       Patient's Goals for Discharge -- return to PO diet  -AW          Pain    Additional Documentation -- Pain Scale: Numbers Pre/Post-Treatment (Group)  -AW          Pain Scale: Numbers Pre/Post-Treatment    Pretreatment Pain Rating -- 0/10 - no pain  -AW       Posttreatment Pain Rating -- 0/10 - no pain  -AW          Oral Motor Structure and Function    Dentition Assessment -- natural, present and adequate  -AW       Secretion Management -- WNL/WFL  -AW       Mucosal Quality -- moist, healthy  -AW       Volitional Swallow -- WFL  -AW          Oral Musculature and Cranial Nerve Assessment    Oral Labial or Buccal Impairment, Detail, Cranial Nerve VII (Facial): -- right labial droop;other (see comments)  slight  -AW          General  Eating/Swallowing Observations    Respiratory Support Currently in Use -- room air  -AW       Eating/Swallowing Skills -- self-fed;fed by SLP  -AW       Positioning During Eating -- upright in bed  -AW       Utensils Used -- spoon;cup;straw  -AW       Consistencies Trialed -- ice chips;thin liquids;pureed;soft to chew textures;mixed consistency;regular textures  -AW          Clinical Swallow Eval    Clinical Swallow Evaluation Summary -- Swallow eval completed. Pt tolerated thins (cup/straw), pureed, soft, and regular coonsistencies with no s/s. Pt coughed in 1/6 trials of mixed. Vocal quality remained clear throughout assessment. Laryngeal elevation was adequate with palpation. Recommend regular w/ thin, no mixed; meds w/ thin or pureed; upright for meals and 30 min after slow rate; small bites/sips. ST to follow for tolerance and need for instrumental.  -AW          SLP Evaluation Clinical Impression    SLP Swallowing Diagnosis -- R/O pharyngeal dysphagia  -AW       Functional Impact -- risk of aspiration/pneumonia  -AW       Rehab Potential/Prognosis, Swallowing -- good, to achieve stated therapy goals  -AW       Swallow Criteria for Skilled Therapeutic Interventions Met -- demonstrates skilled criteria  -AW          Recommendations    Therapy Frequency (Swallow) -- PRN  -AW       Predicted Duration Therapy Intervention (Days) -- until discharge  -AW       SLP Diet Recommendation -- regular textures;no mixed consistencies;thin liquids  -AW       Recommended Precautions and Strategies -- upright posture during/after eating;small bites of food and sips of liquid  -AW       Oral Care Recommendations -- Oral Care BID/PRN  -AW       SLP Rec. for Method of Medication Administration -- meds whole;with thin liquids;with puree;as tolerated  -AW       Monitor for Signs of Aspiration -- yes  -AW       Anticipated Discharge Disposition (SLP) -- extended care facility  -AW          Swallow Goals (SLP)    Swallow STGs -- diet  tolerance goal selection (SLP)  -AW          (STG) Patient will tolerate trials of    Consistencies Trialed (Tolerate trials) -- regular textures;thin liquids  -AW       Desired Outcome (Tolerate trials) -- without signs/symptoms of aspiration  -AW       [place] (Tolerate trials) -- independently (over 90% accuracy)  -AW       Time Frame (Tolerate trials) -- by discharge  -AW       Progress/Outcomes (Tolerate trials) good progress toward goal  -AW --       Comment (Tolerate trials) Pt seen for reeval of swallow. Pt tolerated thins (straw), pureed, soft solids, mixed, and regular with no overt s/s. Mastication was functional. Vocal quality remained clear during trials. Recommend advance to regular and thins; meds as tolerated; upright for meals and 30 min after; slow rate; small bites/sips. ST  to follow.  -AW --                 User Key  (r) = Recorded By, (t) = Taken By, (c) = Cosigned By      Initials Name Effective Dates    AW Meri Dhillon, MS [facility]-SLP 06/16/21 -                     EDUCATION  The patient has been educated in the following areas:   Dysphagia (Swallowing Impairment) Oral Care/Hydration.        SLP GOALS       Row Name 08/17/23 0915 08/15/23 1000          (STG) Patient will tolerate trials of    Consistencies Trialed (Tolerate trials) -- regular textures;thin liquids  -AW     Desired Outcome (Tolerate trials) -- without signs/symptoms of aspiration  -AW     [place] (Tolerate trials) -- independently (over 90% accuracy)  -AW     Time Frame (Tolerate trials) -- by discharge  -AW     Progress/Outcomes (Tolerate trials) good progress toward goal  -AW --     Comment (Tolerate trials) Pt seen for reeval of swallow. Pt tolerated thins (straw), pureed, soft solids, mixed, and regular with no overt s/s. Mastication was functional. Vocal quality remained clear during trials. Recommend advance to regular and thins; meds as tolerated; upright for meals and 30 min after; slow rate; small bites/sips. ST   to follow.  -AW --               User Key  (r) = Recorded By, (t) = Taken By, (c) = Cosigned By      Initials Name Provider Type    Meri Cerna MS CCC-SLP Speech and Language Pathologist                       Time Calculation:    Time Calculation- SLP       Row Name 08/17/23 1219             Time Calculation- SLP    SLP Start Time 0915  -AW      SLP Received On 08/17/23  -AW                User Key  (r) = Recorded By, (t) = Taken By, (c) = Cosigned By      Initials Name Provider Type    Meri Cerna MS CCC-SLP Speech and Language Pathologist                    Therapy Charges for Today       Code Description Service Date Service Provider Modifiers Qty    [id number] HC ST TREATMENT SWALLOW 4 8/17/2023 Meri Dhillon MS CCC-SLP GN 1                 Meri Dhillon MS CCC-MARK  8/17/2023

## 2023-08-17 NOTE — PLAN OF CARE
Goal Outcome Evaluation:  Plan of Care Reviewed With: patient           Outcome Evaluation: Pt is 72 yo male admitted to [facility] with syncope, MI. PMH significant for afib, CHF, DM, HTN, CKD, CVA with R hemiparesis. Pt is a [facility] resident, pleasant but confused. Pt required max/dep A x2 to perform bed mobility, max/dep assist to sit up on EOB once positioned. Pt appears to be total care at baseline at NH with long standing mobility deficits, no indication for acute PT services. d/c plan is to return to NH.      Anticipated Discharge Disposition (PT): extended care facility

## 2023-08-17 NOTE — PLAN OF CARE
The pt was admitted to [facility] from his LTC nursing facility due to aspiration PNA. His pmhx includes Afib, CHF, DM2, CVA, GERD, and AMS. He was oriented x1 today - voiced he lives at home and is independent. He actually lives at a nursing facility and it is unclear what his baseline in. He required total A for all bed mobility and sitting balance. His RUE is chronically weak from his stroke and tone present. No skilled OT services are indicated at this time as he is most likely at his baseline performance. He plans to d/c back to his facility today.       Anticipated Discharge Disposition (OT): extended care facility

## 2023-08-17 NOTE — CASE MANAGEMENT/SOCIAL WORK
"Physicians Statement of Medical Necessity for  Ambulance Transportation    GENERAL INFORMATION     Name: Efra Tam  YOB: 1950  Medicare #:     [id number]     Transport Date: 8/17/2023 (Valid for round trips this date, or for scheduled repetitive trips for 60 days from the date signed below.)  Origin: [facility]   Destination: [place]  Is the Patient's stay covered under Medicare Part A (PPS/DRG?)Yes  Closest appropriate facility? Yes  If this a hosp-hosp transfer? No  Is this a hospice patient? No    MEDICAL NECESSITY QUESTIONAIRE    Ambulance Transportation is medically necessary only if other means of transportation are contraindicated or would be potentially harmful to the patient.  To meet this requirement, the patient must be either \"bed confined\" or suffer from a condition such that transport by means other than an ambulance is contraindicated by the patient's condition.  The following questions must be answered by the healthcare professional signing below for this form to be valid:     1) Describe the MEDICAL CONDITION (physical and/or mental) of this patient AT THE TIME OF AMBULANCE TRANSPORT that requires the patient to be transported in an ambulance, and why transport by other means is contraindicated by the patient's condition:   Past Medical History:   Diagnosis Date    Atrial fibrillation     Chronic systolic CHF (congestive heart failure)     Combined receptive and expressive 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     Gout     Hemiparesis     R sided    Hyperlipidemia     Hypertension     Stroke       Past Surgical History:   Procedure Laterality Date    FRACTURE SURGERY        2) Is this patient \"bed confined\" as defined below?Yes   To be \"bed confined\" the patient must satisfy all three of the following criteria:  (1) unable to get up from bed without assistance; AND (2) unable to ambulate;  AND (3) unable to sit in a chair or wheelchair.  3) Can this patient safely be " transported by car or wheelchair van (I.e., may safely sit during transport, without an attendant or monitoring?)No   4. In addition to completing questions 1-3 above, please check any of the following conditions that apply*:          *Note: supporting documentation for any boxes checked must be maintained in the patient's medical records patient is non-ambulatory at baseline.      SIGNATURE OF PHYSICIAN OR OTHER AUTHORIZED HEALTHCARE PROFESSIONAL    I certify that the above information is true and correct based on my evaluation of this patient, and represent that the patient requires transport by ambulance and that other forms of transport are contraindicated.  I understand that this information will be used by the Centers for Medicare and Medicaid Services (CMS) to support the determiniation of medical necessity for ambulance services, and I represent that I have personal knowledge of the patient's condition at the time of transport.       If this box is checked, I also certify that the patient is physically or mentally incapable of signing the ambulance service's claim form and that the institution with which I am affiliated has furnished care, services or assistance to the patient.  My signature below is made on behalf of the patient pursuant to 42 .36(b)(4). In accordance with 42 .37, the specific reason(s) that the patient is physically or mentally incapable of signing the claim for is as follows:     Signature of Physician or Healthcare Professional  Date/Time:   8/17/2023  11:07 EDT       (For Scheduled repetitive transport, this form is not valid for transports performed more than 60 days after this date).                                                                                                                                            --------------------------------------------------------------------------------------------  Printed Name and Credentials of Physician or Authorized  Healthcare Professional     *Form must be signed by patient's attending physician for scheduled, repetitive transports,.  For non-repetitive ambulance transports, if unable to obtain the signature of the attending physician, any of the following may sign (please select below):     Physician  Clinical Nurse Specialist  Registered Nurse     Physician Assistant  Discharge Planner  Licensed Practical Nurse     Nurse Practitioner  X

## 2023-08-17 NOTE — PLAN OF CARE
Goal Outcome Evaluation:Patient VS stable , keep dry and comfortable after  incontinence episode. No complaints made. Will continue to monitor.

## 2023-08-17 NOTE — PROGRESS NOTES
"Daily progress note    Primary care physician  Dr. Bauman    Subjective  Doing better with no new complaints and back to baseline and wants to go back to nursing home.    History of present illness  73-year-old white male with multiple medical problems including chronic atrial fibrillation congestive heart failure chronic kidney disease stage II CVA with right hemiparesis diabetes mellitus hypertension hyperlipidemia who is a nursing home resident presented to [facility] emergency room with shortness of breath started yesterday.  Patient apparently had a syncopal episode at nursing home.  Patient evaluated in ER found to have pneumonia with acute hypoxic respiratory failure admit for management.  Patient did have nausea vomiting and apparently aspirated with no coffee-ground emesis.  At the time of entry was awake and alert passed the swallow study and eating and feeling better.     REVIEW OF SYSTEMS  Unremarkable except generalized weakness     PHYSICAL EXAM   Blood pressure 144/90, pulse 90, temperature 98.6 øF (37 øC), temperature source Oral, resp. rate 18, height 200.7 cm (79\"), weight 112 kg (246 lb), SpO2 96 %.    GENERAL: Awake and alert in no distress  HEENT: Unremarkable  NECK: Supple  CV: Irregularly irregular S1-S2  RESPIRATORY: Mild respiratory distress with rhonchi in all lung fields and upper airway noises  ABDOMEN: softly distended but nontender with diminished bowel sounds  MUSCULOSKELETAL: no deformity  NEURO: Awake with aphasia and right-sided weakness from prior stroke  PSYCH:  calm, cooperative  SKIN: warm, dry     LAB RESULTS  Lab Results (last 24 hours)       Procedure Component Value Units Date/Time    Basic Metabolic Panel [[id number]]  (Abnormal) Collected: 08/17/23 0427    Specimen: Blood Updated: 08/17/23 0525     Glucose 186 mg/dL      BUN 14 mg/dL      Creatinine 1.46 mg/dL      Sodium 139 mmol/L      Potassium 3.7 mmol/L      Chloride 106 mmol/L      CO2 25.9 mmol/L      " Calcium 8.3 mg/dL      BUN/Creatinine Ratio 9.6     Anion Gap 7.1 mmol/L      eGFR 50.8 mL/min/1.73     Narrative:      GFR Normal >60  Chronic Kidney Disease <60  Kidney Failure <15    The GFR formula is only valid for adults with stable renal function between ages 18 and 70.    CBC & Differential [[id number]]  (Abnormal) Collected: 08/17/23 0427    Specimen: Blood Updated: 08/17/23 0509    Narrative:      The following orders were created for panel order CBC & Differential.  Procedure                               Abnormality         Status                     ---------                               -----------         ------                     CBC Auto Differential[[id number]]        Abnormal            Final result                 Please view results for these tests on the individual orders.    CBC Auto Differential [[id number]]  (Abnormal) Collected: 08/17/23 0427    Specimen: Blood Updated: 08/17/23 0509     WBC 6.56 10*3/mm3      RBC 3.64 10*6/mm3      Hemoglobin 10.6 g/dL      Hematocrit 32.4 %      MCV 89.0 fL      MCH 29.1 pg      MCHC 32.7 g/dL      RDW 13.8 %      RDW-SD 44.6 fl      MPV 11.7 fL      Platelets 109 10*3/mm3      Neutrophil % 68.0 %      Lymphocyte % 16.2 %      Monocyte % 8.1 %      Eosinophil % 6.3 %      Basophil % 0.6 %      Neutrophils, Absolute 4.47 10*3/mm3      Lymphocytes, Absolute 1.06 10*3/mm3      Monocytes, Absolute 0.53 10*3/mm3      Eosinophils, Absolute 0.41 10*3/mm3      Basophils, Absolute 0.04 10*3/mm3     Blood Culture - Blood, Arm, Left [[id number]]  (Normal) Collected: 08/14/23 1503    Specimen: Blood from Arm, Left Updated: 08/16/23 1515     Blood Culture No growth at 2 days    Blood Culture - Blood, Arm, Right [[id number]]  (Normal) Collected: 08/14/23 1443    Specimen: Blood from Arm, Right Updated: 08/16/23 1500     Blood Culture No growth at 2 days          Imaging Results (Last 24 Hours)       ** No results found for the last 24 hours. **          Results    Scan on  8/14/2023 1424 by [facility]: ECG 12-LEAD         Author: -- Service: -- Author Type: --   Filed: Date of Service: Creation Time:   Status: (Other)   HEART RATE= 78  bpm  RR Interval= 769  ms  AZ Interval=   ms  P Horizontal Axis=   deg  P Front Axis=   deg  QRSD Interval= 89  ms  QT Interval= 399  ms  QRS Axis= -16  deg  T Wave Axis= 54  deg  - ABNORMAL ECG -  Atrial fibrillation  Borderline left axis deviation  Borderline low voltage, extremity leads  When compared with ECG of 19-May-2019 14:29:40,  Significant rate decrease otherwise no change          Current Facility-Administered Medications:     acetaminophen (TYLENOL) 160 MG/5ML solution 650 mg, 650 mg, Oral, Q6H PRN, Bernard Paz MD    allopurinol (ZYLOPRIM) tablet 100 mg, 100 mg, Oral, Daily, Bernard Paz MD, 100 mg at 08/17/23 0939    amLODIPine (NORVASC) tablet 5 mg, 5 mg, Oral, Q24H, Bernard Paz MD, 5 mg at 08/17/23 0939    amoxicillin-clavulanate (AUGMENTIN) 875-125 MG per tablet 1 tablet, 1 tablet, Oral, Q12H, Bernard Paz MD    apixaban (ELIQUIS) tablet 5 mg, 5 mg, Oral, Q12H, Bernard Paz MD, 5 mg at 08/17/23 0939    aspirin EC tablet 81 mg, 81 mg, Oral, Daily, Bernard Paz MD, 81 mg at 08/17/23 0939    atorvastatin (LIPITOR) tablet 10 mg, 10 mg, Oral, Nightly, Bernard Paz MD, 10 mg at 08/16/23 2026    carvedilol (COREG) tablet 12.5 mg, 12.5 mg, Oral, BID With Meals, Bernard Paz MD, 12.5 mg at 08/17/23 0939    famotidine (PEPCID) tablet 20 mg, 20 mg, Oral, BID, Bernard Paz MD, 20 mg at 08/17/23 0939    guaiFENesin (MUCINEX) 12 hr tablet 600 mg, 600 mg, Oral, Q12H, Bernard Paz MD, 600 mg at 08/17/23 0939    [COMPLETED] Insert Peripheral IV, , , Once **AND** sodium chloride 0.9 % flush 10 mL, 10 mL, Intravenous, PRN, Usman Naylor MD     ASSESSMENT  Acute hypoxic respiratory failure  Bilateral pneumonia   Chronic atrial fibrillation  Congestive heart failure  Diabetes mellitus  Hypertension  Hyperlipidemia  History of CVA  with right hemiparesis  Chronic kidney disease stage II  Gastroesophageal reflux disease    PLAN  Discharge back to nursing home on oral antibiotics for 5 days  Discharge summary dictated    KARTHIK COSBY MD    Copied text in this note has been reviewed and is accurate as of 08/17/23

## 2023-08-17 NOTE — THERAPY DISCHARGE NOTE
Acute Care - Occupational Therapy Discharge  [facility]    Patient Name: Efra Tam  : 1950    MRN: [id number]                              Today's Date: 2023       Admit Date: 2023    Visit Dx:     ICD-10-CM ICD-9-CM   1. Syncope and collapse  R55 780.2   2. Acute respiratory failure with hypoxia  J96.01 518.81   3. Aspiration pneumonia of both lungs, unspecified aspiration pneumonia type, unspecified part of lung  J69.0 507.0   4. History of stroke  Z86.73 V12.54   5. History of atrial fibrillation  Z86.79 V12.59     Patient Active Problem List   Diagnosis    ANIBAL (acute kidney injury)    Stroke    Hemiparesis    Hypertension    Chronic systolic CHF (congestive heart failure)    Atrial fibrillation    Combined receptive and expressive 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    Type 2 diabetes mellitus with renal complication    Dehydration    Metabolic encephalopathy    Hypoxia    CKD (chronic kidney disease) stage 2, GFR 60-89 ml/min    Sepsis secondary to UTI    Aspiration pneumonia     Past Medical History:   Diagnosis Date    Atrial fibrillation     Chronic systolic CHF (congestive heart failure)     Combined receptive and expressive 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 He actually lives at a LTC facility  -RB     Existing Precautions/Restrictions fall  -RB     Barriers to Rehab medically complex;previous functional deficit;cognitive status  -RB       Row Name 23 1129           Living Environment    People in Home facility resident  -RB       Row Name 08/17/23 1129          Cognition    Orientation Status (Cognition) oriented to;person;verbal cues/prompts needed for orientation  -RB       Row Name 08/17/23 1129          Safety Issues, Functional Mobility    Safety Issues Affecting Function (Mobility) insight into deficits/self-awareness;judgment;awareness of need for assistance;problem-solving;safety precaution awareness;safety precautions follow-through/compliance;sequencing abilities;ability to follow commands  -RB     Impairments Affecting Function (Mobility) balance;cognition;coordination;endurance/activity tolerance;motor control;strength;range of motion (ROM);postural/trunk control  -RB               User Key  (r) = Recorded By, (t) = Taken By, (c) = Cosigned By      Initials Name Provider Type    RB Madisyn Helms OT Occupational Therapist                   Mobility/ADL's       Row Name 08/17/23 1130          Bed Mobility    Bed Mobility supine-sit;sit-supine;scooting/bridging  -     Scooting/Bridging [place] (Bed Mobility) dependent (less than 25% patient effort);2 person assist;verbal cues;nonverbal cues (demo/gesture)  -RB     Supine-Sit [place] (Bed Mobility) dependent (less than 25% patient effort);2 person assist;verbal cues;nonverbal cues (demo/gesture)  -RB     Sit-Supine [place] (Bed Mobility) dependent (less than 25% patient effort);2 person assist;verbal cues;nonverbal cues (demo/gesture)  -       Row Name 08/17/23 1130          Sit-Stand Transfer    Sit-Stand [place] (Transfers) not tested  -RB       Row Name 08/17/23 1130          Stand-Sit Transfer    Stand-Sit [place] (Transfers) not tested  -       Row Name 08/17/23 1130          Functional Mobility    Functional Mobility- Ind. Level not tested  -RB       Row Name 08/17/23 1130          Activities of Daily Living    BADL Assessment/Intervention lower body dressing  -       Row  Name 08/17/23 1130          Lower Body Dressing Assessment/Training    [place] Level (Lower Body Dressing) lower body dressing skills;dependent (less than 25% patient effort)  -RB               User Key  (r) = Recorded By, (t) = Taken By, (c) = Cosigned By      Initials Name Provider Type    Madisyn Torres OT Occupational Therapist                   Obj/Interventions       Row Name 08/17/23 1130          Sensory Assessment (Somatosensory)    Sensory Assessment (Somatosensory) sensation intact  -RB       Row Name 08/17/23 1130          Vision Assessment/Intervention    Visual Impairment/Limitations WFL  -RB       Row Name 08/17/23 1130          Range of Motion Comprehensive    Comment, General Range of Motion LUE WFL, RUE tone present and impaired full AROM due to his prior stroke  -RB       Row Name 08/17/23 1130          Strength Comprehensive (MMT)    Comment, General Manual Muscle Testing (MMT) Assessment RUE/RLE weaker 2/2 to a prior stroke, AAROM WFL, flexor tone present  -RB       Row Name 08/17/23 1130          Balance    Comment, Balance Max-dependent for seated EOB sitting balance. L lateral lean. He sat at the EOB x6 minutes  -RB               User Key  (r) = Recorded By, (t) = Taken By, (c) = Cosigned By      Initials Name Provider Type    Madisyn Torres OT Occupational Therapist                   Goals/Plan       Row Name 08/17/23 1132          Self-Feeding Goal 1 (OT)    Activity/Device (Self-Feeding Goal 1, OT) self-feeding skills, all  -RB     [place] Level/Cues Needed (Self-Feeding Goal 1, OT) minimum assist (75% or more patient effort)  -RB     Time Frame (Self-Feeding Goal 1, OT) 2 weeks;short term goal (STG)  -RB     Progress/Outcomes (Self-Feeding Goal 1, OT) goal met  -RB       Row Name 08/17/23 1132          Therapy Assessment/Plan (OT)    Planned Therapy Interventions (OT) BADL retraining  -RB               User Key  (r) = Recorded By, (t) = Taken By, (c) = Cosigned By       Initials Name Provider Type    Madisyn Torres OT Occupational Therapist                   Clinical Impression       Row Name 08/17/23 1131          Pain Assessment    Pretreatment Pain Rating 0/10 - no pain  -RB     Posttreatment Pain Rating 0/10 - no pain  -RB       Row Name 08/17/23 1131          Plan of Care Review    Plan of Care Reviewed With patient  -RB     Progress improving  -RB       Row Name 08/17/23 1131          Therapy Assessment/Plan (OT)    Criteria for Skilled Therapeutic Interventions Met (OT) no;does not meet criteria for skilled intervention  he is at his baseline performance  -RB       Row Name 08/17/23 1131          Therapy Plan Review/Discharge Plan (OT)    Anticipated Discharge Disposition (OT) extended care facility  -RB       Row Name 08/17/23 1131          Vital Signs    Pre Patient Position Supine  -RB     Intra Patient Position Sitting  -RB     Post Patient Position Supine  -RB       Row Name 08/17/23 1131          Positioning and Restraints    Pre-Treatment Position in bed  -RB     Post Treatment Position bed  -RB     In Bed supine;notified nsg;call light within reach;encouraged to call for assist;exit alarm on;fowlers;LUE elevated;RUE elevated;side lying right  -RB               User Key  (r) = Recorded By, (t) = Taken By, (c) = Cosigned By      Initials Name Provider Type    Madisyn Torres OT Occupational Therapist                   Outcome Measures       Row Name 08/17/23 1132          How much help from another is currently needed...    Putting on and taking off regular lower body clothing? 1  -RB     Bathing (including washing, rinsing, and drying) 1  -RB     Toileting (which includes using toilet bed pan or urinal) 1  -RB     Putting on and taking off regular upper body clothing 2  -RB     Taking care of personal grooming (such as brushing teeth) 2  -RB     Eating meals 3  -RB     AM-PAC 6 Clicks Score (OT) 10  -RB       Row Name 08/17/23 1100 08/17/23 0907        How much help from another person do you currently need...    Turning from your back to your side while in flat bed without using bedrails? 1  -EM 2  -BB    Moving from lying on back to sitting on the side of a flat bed without bedrails? 1  -EM 2  -BB    Moving to and from a bed to a chair (including a wheelchair)? 1  -EM 1  -BB    Standing up from a chair using your arms (e.g., wheelchair, bedside chair)? 1  -EM 1  -BB    Climbing 3-5 steps with a railing? 1  -EM 1  -BB    To walk in hospital room? 1  -EM 1  -BB    AM-PAC 6 Clicks Score (PT) 6  -EM 8  -BB      Row Name 08/17/23 1132          Modified Corey Scale    Modified Newberry Scale 5 - Severe disability.  Bedridden, incontinent, and requiring constant nursing care and attention.  -RB       Row Name 08/17/23 1132          Functional Assessment    Outcome Measure Options AM-PAC 6 Clicks Daily Activity (OT);Modified Newberry  -RB               User Key  (r) = Recorded By, (t) = Taken By, (c) = Cosigned By      Initials Name Provider Type    EM Staci Clifton, PT Physical Therapist    Madisyn Torres, OT Occupational Therapist    Wendie Hilario, RN Registered Nurse                  Occupational Therapy Education       Title: PT OT SLP Therapies (In Progress)       Topic: Occupational Therapy (Not Started)       Point: ADL training (Not Started)       Description:   Instruct learner(s) on proper safety adaptation and remediation techniques during self care or transfers.   Instruct in proper use of assistive devices.                  Learner Progress:  Not documented in this visit.              Point: Home exercise program (Not Started)       Description:   Instruct learner(s) on appropriate technique for monitoring, assisting and/or progressing therapeutic exercises/activities.                  Learner Progress:  Not documented in this visit.              Point: Precautions (Not Started)       Description:   Instruct learner(s) on prescribed  precautions during self-care and functional transfers.                  Learner Progress:  Not documented in this visit.              Point: Body mechanics (Not Started)       Description:   Instruct learner(s) on proper positioning and spine alignment during self-care, functional mobility activities and/or exercises.                  Learner Progress:  Not documented in this visit.                                  OT Recommendation and Plan  Planned Therapy Interventions (OT): BADL retraining  Plan of Care Review  Plan of Care Reviewed With: patient  Progress: improving  Plan of Care Reviewed With: patient     Time Calculation:   Evaluation Complexity (OT)  Review Occupational Profile/Medical/Therapy History Complexity: expanded/moderate complexity  Assessment, Occupational Performance/Identification of Deficit Complexity: 3-5 performance deficits  Clinical Decision Making Complexity (OT): detailed assessment/moderate complexity  Overall Complexity of Evaluation (OT): moderate complexity     Time Calculation- OT       Row Name 08/17/23 1128             Time Calculation- OT    OT Start Time 1030  -RB      OT Stop Time 1046  -RB      OT Time Calculation (min) 16 min  -RB      Total Timed Code Minutes- OT 8 minute(s)  -RB      OT Received On 08/17/23  -RB         Timed Charges    08430 - OT Therapeutic Activity Minutes 8  -RB         Untimed Charges    OT Eval/Re-eval Minutes 8  -RB         Total Minutes    Timed Charges Total Minutes 8  -RB      Untimed Charges Total Minutes 8  -RB       Total Minutes 16  -RB                User Key  (r) = Recorded By, (t) = Taken By, (c) = Cosigned By      Initials Name Provider Type    RB Madisyn Helms OT Occupational Therapist                  Therapy Charges for Today       Code Description Service Date Service Provider Modifiers Qty    14320489027  OT THERAPEUTIC ACT EA 15 MIN 8/17/2023 Madisyn Helms OT GO 1    51543205112  OT EVAL MOD COMPLEXITY 2 8/17/2023  Madisyn Helms, OT GO 1               OT Discharge Summary  Anticipated Discharge Disposition (OT): extended care facility    Madisyn Helms OT  8/17/2023

## 2023-08-17 NOTE — CASE MANAGEMENT/SOCIAL WORK
Continued Stay Note  [facility]     Patient Name: Efra Tam  MRN: [id number]  Today's Date: 8/17/2023    Admit Date: 8/14/2023    Plan: Return to [facility] bed via [facility] @ 9pm this evening.   Discharge Plan       Row Name 08/17/23 1100       Plan    Plan Return to [facility] bed via [facility] @ 9pm this evening.    Plan Comments CCP notified by Dr. Paz in person that patient is stable for discharge. CCP arranged for [facility] to transport patient back to his long term care bed at [facility] at 9pm. CCP to follow.                   Discharge Codes    No documentation.                 Expected Discharge Date and Time       Expected Discharge Date Expected Discharge Time    Aug 17, 2023

## 2023-08-18 VITALS
TEMPERATURE: 97.9 F | HEIGHT: 78 IN | OXYGEN SATURATION: 97 % | SYSTOLIC BLOOD PRESSURE: 163 MMHG | DIASTOLIC BLOOD PRESSURE: 97 MMHG | WEIGHT: 246 LBS | BODY MASS INDEX: 28.46 KG/M2 | HEART RATE: 73 BPM | RESPIRATION RATE: 18 BRPM

## 2023-08-18 RX ADMIN — AMLODIPINE BESYLATE 5 MG: 5 TABLET ORAL at 08:04

## 2023-08-18 RX ADMIN — APIXABAN 5 MG: 5 TABLET, FILM COATED ORAL at 08:04

## 2023-08-18 RX ADMIN — AMOXICILLIN AND CLAVULANATE POTASSIUM 1 TABLET: 875; 125 TABLET, FILM COATED ORAL at 08:04

## 2023-08-18 RX ADMIN — GUAIFENESIN 600 MG: 600 TABLET, EXTENDED RELEASE ORAL at 08:04

## 2023-08-18 RX ADMIN — ASPIRIN 81 MG: 81 TABLET, COATED ORAL at 08:03

## 2023-08-18 RX ADMIN — FAMOTIDINE 20 MG: 20 TABLET, FILM COATED ORAL at 08:04

## 2023-08-18 RX ADMIN — ALLOPURINOL 100 MG: 100 TABLET ORAL at 08:04

## 2023-08-18 RX ADMIN — CARVEDILOL 12.5 MG: 12.5 TABLET, FILM COATED ORAL at 08:04

## 2023-08-18 NOTE — PROGRESS NOTES
"Enter Query Response Below      Query Response:       Acute hypoxic respiratory failure secondary to aspiration pneumonia resolved Electronically signed by Bernard Paz MD, 23, 11:16 AM EDT.         If applicable, please update the problem list.       Patient: Efra Tam \"Hector\"        : 1950  Account: [id number]           Admit Date:         How to Respond to this query:       a. Click New Note     b. Answer query within the yellow box.                c. Update the Problem List, if applicable.      If you have any questions about this query contact me at: [email or web address]         73 year old patient admitted and diagnosed with aspiration pneumonia on .   H&P notes mild distress on exam.    @ 1415 Initial vital signs:  Temperature 98.9, heart rate 77, blood pressure 13/113, respirations 22, 02 saturation 100% on 15L 02.   @1803 Temperature 102.2, heart rate 104, respirations 20, blood pressure 164/106, 02 saturation 96% on 4L 02, and returned to room air by  that evening and remained on room air through discharge.     After study, was acute respiratory failure clinically supported during this admission?    Acute respiratory failure was supported with additional clinical indicators: ____________    Acute respiratory failure was not supported, acute hypoxia only    Other- specify_____________    Unable to determine      By submitting this query, we are merely seeking further clarification of documentation to accurately reflect all conditions that you are monitoring, evaluating, treating or that extend the hospitalization or utilize additional resources of care. Please utilize your independent clinical judgment when addressing the question(s) above.     This query and your response, once completed, will be entered into the legal medical record.    Sincerely,  Ellen HERRING RN CDI CCDS  Clinical Documentation Integrity Program   "

## 2023-08-18 NOTE — CASE MANAGEMENT/SOCIAL WORK
Case Management Discharge Note      Final Note: Return to [facility] via  [facility] this AM.    Provided Post Acute Provider List?: N/A    Selected Continued Care - Discharged on 8/18/2023 Admission date: 8/14/2023 - Discharge disposition: Skilled Nursing Facility (DC - External)      Destination Coordination complete.      Service Provider Selected Services Address Phone Fax Patient Preferred    [facility] [address] [id number] [phone] --              Durable Medical Equipment    No services have been selected for the patient.                Dialysis/Infusion    No services have been selected for the patient.                Home Medical Care    No services have been selected for the patient.                Therapy    No services have been selected for the patient.                Community Resources    No services have been selected for the patient.                Community & DME    No services have been selected for the patient.                    Transportation Services  Ambulance: [facility]    Final Discharge Disposition Code: 04 - intermediate care facility

## 2023-08-18 NOTE — NURSING NOTE
Per Gabrielle with [facility], they will be unable to get patient tonight and have rescheduled for 0900 tomorrow am.  Edouard Arango notified at 21:02

## 2023-08-19 LAB
BACTERIA SPEC AEROBE CULT: NORMAL
BACTERIA SPEC AEROBE CULT: NORMAL

## 2023-11-15 ENCOUNTER — OFFICE VISIT (OUTPATIENT)
Dept: CARDIOLOGY | Facility: CLINIC | Age: 73
End: 2023-11-15
Payer: COMMERCIAL

## 2023-11-15 VITALS
HEART RATE: 79 BPM | WEIGHT: 280 LBS | SYSTOLIC BLOOD PRESSURE: 124 MMHG | BODY MASS INDEX: 34.82 KG/M2 | DIASTOLIC BLOOD PRESSURE: 78 MMHG | HEIGHT: 75 IN

## 2023-11-15 DIAGNOSIS — I48.0 PAROXYSMAL ATRIAL FIBRILLATION: Primary | ICD-10-CM

## 2023-11-15 DIAGNOSIS — I10 PRIMARY HYPERTENSION: ICD-10-CM

## 2023-11-15 DIAGNOSIS — N18.2 CKD (CHRONIC KIDNEY DISEASE) STAGE 2, GFR 60-89 ML/MIN: ICD-10-CM

## 2023-11-15 PROCEDURE — 3078F DIAST BP <80 MM HG: CPT | Performed by: INTERNAL MEDICINE

## 2023-11-15 PROCEDURE — 3074F SYST BP LT 130 MM HG: CPT | Performed by: INTERNAL MEDICINE

## 2023-11-15 PROCEDURE — 99204 OFFICE O/P NEW MOD 45 MIN: CPT | Performed by: INTERNAL MEDICINE

## 2023-11-15 PROCEDURE — 93000 ELECTROCARDIOGRAM COMPLETE: CPT | Performed by: INTERNAL MEDICINE

## 2023-11-15 RX ORDER — FAMOTIDINE 20 MG/1
20 TABLET, FILM COATED ORAL 2 TIMES DAILY
COMMUNITY

## 2023-11-15 RX ORDER — AMLODIPINE BESYLATE 5 MG/1
5 TABLET ORAL DAILY
COMMUNITY

## 2023-11-15 NOTE — PROGRESS NOTES
Date of Office Visit: 11/15/2023  Encounter Provider: Marina Puentes MD  Place of Service: [facility] CARDIOLOGY  Patient Name: Efra Tam  :1950      Patient ID:  Efra Tam is a 73 y.o. male is here for atrial fibrillation          History of Present Illness    He has a history atrial fibrillation, murmur, stroke with right-sided weakness, heart failure, GERD, hypertension, ANIBAL, peripheral arterial disease, gout.    He is , 3 children, is disabled, never smoked, uses no alcohol, has 1 caffeinated beverage per day and no drugs.    He was admitted  - 2023 with acute hypoxic respiratory failure due to bilateral pneumonia.  He was discharged to nursing care facility to finish his antibiotics and was weaned off of oxygen during hospitalization.Labs on 2023 showed creatinine 1.46, glucose 186 with otherwise normal BMP, hemoglobin 10.6, platelets 109, otherwise normal CBC.  Labs done 2023 show glucose 219 with creatinine 1.7, otherwise normal CMP, hemoglobin A1c 7.7%, normal TSH, total cholesterol 116, HDL 36, LDL 65, VLDL 15, triglycerides 74, hemoglobin 11, platelets 106, otherwise normal CBC.  Echo done 2023 shows ejection fraction 55% with mild concentric left ventricular hypertrophy, severe left atrial enlargement, normal diastolic function, mild aortic insufficiency.  CT chest abdomen pelvis done 2023 showed pulmonary groundglass opacities likely infectious or inflammatory, normal abdomen and pelvis, no pericardial effusion.  I did review the CT images showing scattered calcification in the aortic arch, scattered calcification of the aortic valve, proximal LAD calcification, left main calcification, RCA not well visualized.  CT head showed remote left PCA distribution infarct.    Venous duplex study done 2019 showed chronic right and left lower extremity deep venous thrombosis in the gastrocnemius and soleal.    Past  Medical History:   Diagnosis Date    ANIBAL (acute kidney injury)     Atrial fibrillation     Chronic systolic CHF (congestive heart failure)     Combined receptive and expressive 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     DM (diabetes mellitus)     Gout     Hemiparesis     R sided    Hyperlipidemia     Hypertension     PAD (peripheral artery disease)     Stroke          Past Surgical History:   Procedure Laterality Date    FRACTURE SURGERY         Current Outpatient Medications on File Prior to Visit   Medication Sig Dispense Refill    acetaminophen (TYLENOL) 325 MG tablet Take 2 tablets by mouth Every 4 (Four) Hours As Needed for Mild Pain.      allopurinol (ZYLOPRIM) 100 MG tablet Take 1 tablet by mouth Daily.      amLODIPine (NORVASC) 5 MG tablet Take 1 tablet by mouth Daily.      apixaban (ELIQUIS) 5 MG tablet tablet Take 1 tablet by mouth Every 12 (Twelve) Hours. 60 tablet     Artificial Tear Ointment (artificial tears) ophthalmic ointment Administer 1 application  to both eyes Every Night.      aspirin 81 MG EC tablet Take 1 tablet by mouth Daily.      atorvastatin (LIPITOR) 10 MG tablet Take 1 tablet by mouth Every Night.      carvedilol (COREG) 12.5 MG tablet Take 1 tablet by mouth 2 (Two) Times a Day With Meals for 30 days. 60 tablet 0    cetirizine (zyrTEC) 10 MG tablet Take 1 tablet by mouth Every Night.      famotidine (PEPCID) 20 MG tablet Take 1 tablet by mouth 2 (Two) Times a Day.      metFORMIN (GLUCOPHAGE) 500 MG tablet Take 1 tablet by mouth Daily.      polyethylene glycol (MIRALAX) packet Take 17 g by mouth Daily As Needed.      VITAMIN D PO Take  by mouth.      [DISCONTINUED] glipiZIDE (GLUCOTROL) 10 MG tablet Take 1 tablet by mouth Every Morning. (Patient not taking: Reported on 11/15/2023)      [DISCONTINUED] glipiZIDE (GLUCOTROL) 5 MG tablet Take 1 tablet by mouth Every Evening. (Patient not taking: Reported on 11/15/2023)      [DISCONTINUED] insulin regular (humuLIN R,novoLIN R) 100 UNIT/ML injection Inject  "2-8 Units under the skin into the appropriate area as directed 3 (Three) Times a Day Before Meals. PER SS: 150-200=2, 201-250=4, 251-300=6, 301-350=8 (Patient not taking: Reported on 11/15/2023)       No current facility-administered medications on file prior to visit.       Social History     Socioeconomic History    Marital status:     Number of children: 3   Tobacco Use    Smoking status: Never     Passive exposure: Never    Smokeless tobacco: Never    Tobacco comments:     Caffeine: 1 drink daily   Vaping Use    Vaping Use: Never used   Substance and Sexual Activity    Alcohol use: No    Drug use: No    Sexual activity: Never           ROS    Procedures    ECG 12 Lead    Date/Time: 11/15/2023 9:06 AM  Performed by: Marina Puentes MD    Authorized by: Marina Puentes MD  Comparison: compared with previous ECG   Similar to previous ECG  Rhythm: atrial fibrillation    Clinical impression: abnormal EKG              Objective:      Vitals:    11/15/23 0856   BP: 124/78   Pulse: 79   Weight: 127 kg (280 lb)   Height: 190.5 cm (75\")     Body mass index is 35 kg/m².    Vitals reviewed.   Constitutional:       General: Not in acute distress.     Appearance: Well-developed. Not diaphoretic.   Eyes:      General: No scleral icterus.     Conjunctiva/sclera: Conjunctivae normal.   HENT:      Head: Normocephalic and atraumatic.   Neck:      Thyroid: No thyromegaly.      Vascular: No carotid bruit or JVD.      Lymphadenopathy: No cervical adenopathy.   Pulmonary:      Effort: Pulmonary effort is normal. No respiratory distress.      Breath sounds: Normal breath sounds. No wheezing. No rhonchi. No rales.   Chest:      Chest wall: Not tender to palpatation.   Cardiovascular:      Normal rate. Irregularly irregular rhythm.      Murmurs: There is no murmur.      No gallop.  No rub.   Pulses:     Intact distal pulses.      Carotid: 2+ bilaterally.     Radial: 2+ bilaterally.     Dorsalis pedis: 2+ bilaterally.   "   Posterior tibial: 2+ bilaterally.  Edema:     Peripheral edema absent.   Abdominal:      General: Bowel sounds are normal. There is no distension or abdominal bruit.      Palpations: Abdomen is soft. There is no abdominal mass.      Tenderness: There is no abdominal tenderness.   Musculoskeletal:         General: No deformity.      Extremities: No clubbing present.     Cervical back: Neck supple. Skin:     General: Skin is warm and dry. There is no cyanosis.      Coloration: Skin is not pale.      Findings: No rash.   Neurological:      Mental Status: Alert and oriented to person, place, and time.      Cranial Nerves: No cranial nerve deficit.   Psychiatric:         Judgment: Judgment normal.         Lab Review:       Assessment:      Diagnosis Plan   1. Paroxysmal atrial fibrillation        2. Primary hypertension        3. CKD (chronic kidney disease) stage 2, GFR 60-89 ml/min          Atrial fibrillation, persistent.  Remain on Eliquis and carvedilol.  He is well rate controlled.  His [id number] score is 5 giving a 6.7% annual risk of stroke.  Hypertension, goal <120/80.  Remain on current medications as he is well controlled.  CKD  Diabetes mellitus type 2  History of stroke with right hemiparesis  Recent pneumonia with respiratory failure, resolved  Peripheral arterial disease with history of bilateral lower extremity venous thrombi.     Plan:       No other testing needed at this time, see Odalys in 6 months.  Overall stable.

## 2024-05-15 ENCOUNTER — OFFICE VISIT (OUTPATIENT)
Dept: CARDIOLOGY | Facility: CLINIC | Age: 74
End: 2024-05-15
Payer: MEDICARE

## 2024-05-15 VITALS
WEIGHT: 280 LBS | BODY MASS INDEX: 34.82 KG/M2 | HEART RATE: 90 BPM | HEIGHT: 75 IN | SYSTOLIC BLOOD PRESSURE: 125 MMHG | DIASTOLIC BLOOD PRESSURE: 80 MMHG

## 2024-05-15 DIAGNOSIS — I48.21 PERMANENT ATRIAL FIBRILLATION: Primary | ICD-10-CM

## 2024-05-15 DIAGNOSIS — N18.31 STAGE 3A CHRONIC KIDNEY DISEASE: ICD-10-CM

## 2024-05-15 DIAGNOSIS — I69.352 HEMIPARESIS OF LEFT DOMINANT SIDE AS LATE EFFECT OF CEREBRAL INFARCTION: ICD-10-CM

## 2024-05-15 DIAGNOSIS — I63.432 CEREBROVASCULAR ACCIDENT (CVA) DUE TO EMBOLISM OF LEFT POSTERIOR CEREBRAL ARTERY: ICD-10-CM

## 2024-05-15 DIAGNOSIS — E11.22 TYPE 2 DIABETES MELLITUS WITH STAGE 3A CHRONIC KIDNEY DISEASE, WITHOUT LONG-TERM CURRENT USE OF INSULIN: ICD-10-CM

## 2024-05-15 DIAGNOSIS — I10 PRIMARY HYPERTENSION: ICD-10-CM

## 2024-05-15 DIAGNOSIS — N18.31 TYPE 2 DIABETES MELLITUS WITH STAGE 3A CHRONIC KIDNEY DISEASE, WITHOUT LONG-TERM CURRENT USE OF INSULIN: ICD-10-CM

## 2024-05-15 NOTE — PROGRESS NOTES
Spoke to Brandee at the facility. Made her aware of no changes and she said she will fax weights and vitals over.     Ashleigh Martinez RN  Triage [facility]

## 2024-05-15 NOTE — PROGRESS NOTES
"      [facility] CARDIOLOGY  [address] [facility] [address] [address]  [place] [address]  Phone: [phone]  Fax: [phone]  Patient Name: Efra Tam  :1950  Age: 73 y.o.  Primary Cardiologist: Marina Puentes MD  Encounter Provider:  DOLORES Wilkinson    History of Present Illness     Efra Tam is a 73 y.o.  male whose medical history includes hypertension, CKD 2, GERD, peripheral arterial disease, gout, history of stroke with right-sided weakness.  He is followed in our office by Dr. Puentes for paroxysmal atrial fibrillation. I have reviewed the past medical records in preparation of today's visit.     05/15/24 Follow-up:  He is here for 6-month follow-up and I am seeing him for the first time today. He's here with an employee from [facility] in [place], [place]; she is not aware of any recent issues that he may be having.  He is in the pea unit and [facility] [address].  Review of paper sent today show no changes to his medications.  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  He denies chest pain, dyspnea with exertion, orthopnea, palpitations, syncope, or leg swelling.  It appears that he is pretty sedentary.  His appetite is good.    [id number] SCORE   [id number] Score: 7 (5/15/2024  9:50 AM)       Data Review     The following data was reviewed by DOLORES Wilkinson on 05/15/24:    Vital Signs:   /80   Pulse 90   Ht 190.5 cm (75\")   Wt 127 kg (280 lb)   BMI 35.00 kg/m²       Weight:  Wt Readings from Last 3 Encounters:   05/15/24 127 kg (280 lb)   11/15/23 127 kg (280 lb)   23 112 kg (246 lb)     Body mass index is 35 kg/m².    Below is a summary of pertinent cardiology findings:  He is , has 3 children, and is disabled.  He has never smoked, uses no alcohol has 1 caffeinated beverage per day, and uses no drugs.  Longstanding " history of paroxysmal atrial fibrillation; his stroke was from noncompliance with warfarin.  April 2019 echocardiogram shows EF 51%, mild regurgitation, mild mitral regurgitation, mild tricuspid valve regurgitation, and moderate tension.  August 2023 he was admitted with acute hypoxic respiratory failure due to bilateral pneumonia.  August 2023 echocardiogram shows EF 55.1%, mild concentric LVH, severe left atrial dilation, normal diastolic function, and no significant valvular disease.    Labs:  08/16/2023:  cr 1.7, K 3.7, otherwise unremarkable CMP, HgbA1c 7.70, TSH 1.700, Chol 116, HDL 36, LDL 65, Trig 74, Hgb 11.0, Plt 106      ECG 12 Lead    Date/Time: 5/15/2024 9:54 AM  Performed by: Sydney Brown APRN    Authorized by: Sydney Brown APRN  Comparison: compared with previous ECG from 11/15/2023  Similar to previous ECG  Rhythm: atrial fibrillation  Rate: normal  BPM: 90          Medications     Allergies as of 05/15/2024    (No Known Allergies)       Current Outpatient Medications   Medication Instructions    acetaminophen (TYLENOL) 650 mg, Oral, Every 4 Hours PRN    allopurinol (ZYLOPRIM) 100 mg, Oral, Daily    amLODIPine (NORVASC) 5 mg, Oral, Daily    apixaban (ELIQUIS) 5 mg, Oral, Every 12 Hours Scheduled    Artificial Tear Ointment (artificial tears) ophthalmic ointment 1 application , Both Eyes, Nightly    aspirin 81 mg, Oral, Daily    atorvastatin (LIPITOR) 10 mg, Oral, Nightly    carvedilol (COREG) 12.5 mg, Oral, 2 Times Daily With Meals    cetirizine (ZYRTEC) 10 mg, Oral, Nightly    famotidine (PEPCID) 20 mg, Oral, Daily    metFORMIN (GLUCOPHAGE) 500 mg, Oral, Daily    polyethylene glycol (MIRALAX) packet 17 g, Oral, Daily PRN    VITAMIN D PO Oral        Past History, Review of Systems, Exam     Past Medical History:   Diagnosis Date    ANIBAL (acute kidney injury)     Atrial fibrillation     Chronic systolic CHF (congestive heart failure)     Combined receptive and expressive  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     DM (diabetes mellitus)     Gout     Hemiparesis     Hyperlipidemia     Hypertension     PAD (peripheral artery disease)     Stroke        Past Surgical History:   has a past surgical history that includes Fracture surgery.     Social History     Socioeconomic History    Marital status:     Number of children: 3   Tobacco Use    Smoking status: Never     Passive exposure: Never    Smokeless tobacco: Never    Tobacco comments:     Caffeine: 1 drink daily   Vaping Use    Vaping status: Never Used   Substance and Sexual Activity    Alcohol use: Yes     Alcohol/week: 6.0 - 8.0 standard drinks of alcohol     Types: 6 - 8 Cans of beer per week    Drug use: No    Sexual activity: Never       Review of Systems   Cardiovascular: Negative.        Vitals reviewed.   Constitutional:       Appearance: Not in distress.   Eyes:      Conjunctiva/sclera: Conjunctivae normal.      Pupils: Pupils are equal, round, and reactive to light.   HENT:      Head: Normocephalic.      Nose: Nose normal.    Mouth/Throat:      Pharynx: Oropharynx is clear.   Neck:      Vascular: JVD normal.   Pulmonary:      Effort: Pulmonary effort is normal.      Breath sounds: Normal breath sounds. No wheezing. No rhonchi. No rales.   Cardiovascular:      Normal rate. Irregularly irregular rhythm. Normal S1. Normal S2.       Murmurs: There is no murmur.   Pulses:     Intact distal pulses.   Edema:     Peripheral edema present.     Ankle: bilateral trace edema of the ankle.  Abdominal:      General: Bowel sounds are normal. There is no distension.      Palpations: Abdomen is soft.      Tenderness: There is no abdominal tenderness.   Musculoskeletal: Normal range of motion.      Cervical back: Normal range of motion and neck supple. Skin:     General: Skin is warm and dry.   Neurological:      Mental Status: Alert and oriented to person, place and time.   Psychiatric:         Attention and Perception: Attention normal.          Mood and Affect: Mood normal.         Speech: Speech normal.         Behavior: Behavior is cooperative.          Assessment and Plan     Assessment:  1. Permanent atrial fibrillation    2. Primary hypertension    3. Type 2 diabetes mellitus with stage 3a chronic kidney disease, without long-term current use of insulin    4. Stage 3a chronic kidney disease    5. Cerebrovascular accident (CVA) due to embolism of left posterior cerebral artery    6. Hemiparesis of left dominant side as late effect of cerebral infarction         Permanent atrial fibrillation: Longstanding history of atrial fibrillation.  His CHADS2 vascular score is 7 and he is anticoagulated with 5 mg apixaban twice daily; this is the appropriate dose for his age and size.  He has history of stroke when not taking warfarin as directed.  August 2023 echo shows severe left atrial dilation and no significant valvular disease with EF 55%.  His heart rate is controlled on 12.5 mg carvedilol twice daily.  Hypertension: He is treated with 12.5 mg carvedilol twice daily and 5 mg amlodipine.  BP controlled in office but no record of BP trends provided today.  Type 2 diabetes: Hemoglobin A1c at goal when checked in August 2023.  He is on metformin.  History of left posterior CVA: This was likely in the setting of A-fib while not taking warfarin as directed.  He has resulting right-sided weakness.  He is in wheelchair today and I suspect he is sedentary.  He has some confusion and I also suspect he may have dementia.  Right-sided weakness: As a result of stroke.  He resides in an acute care facility.    Mr. Tam is a patient who has seen Dr. Puentes and has history of chronic atrial fibrillation rate controlled on carvedilol and anticoagulated with 5 mg apixaban twice daily which is the appropriate dose for his age and size.  He has history of left-sided stroke with right-sided hemiparesis and he lives in a rehab facility.    He appears good today and his  medications have not changed.  I will have our office contact [facility] for a list of his most recent vital signs.  For now I am going to schedule him a 6-month appointment with Dr. Puentes.    05/16/24 Addendum:  Review of records received from [facility] shows weight to be stable. BP typically 120-140 with HR 70s. Stable; no changes.     Return in about 6 months (around 11/15/2024) for Follow-up, Dr. Puentes.  Orders Placed This Encounter   Procedures    ECG 12 Lead      No orders of the defined types were placed in this encounter.        Thank you for the opportunity to participate in this patient's care.    DOLORES Collier    This office note has been dictated.

## 2024-05-15 NOTE — Clinical Note
He is a resident at [facility] in [place], [phone]. He is in the [facility] unit, room 31B. Can you let them know I made no changes today? Can they fax a list of vital signs and weights to [phone].  Thanks!

## 2024-11-21 ENCOUNTER — TELEPHONE (OUTPATIENT)
Dept: CARDIOLOGY | Facility: CLINIC | Age: 74
End: 2024-11-21

## 2024-11-21 ENCOUNTER — HOSPITAL ENCOUNTER (OUTPATIENT)
Dept: CARDIOLOGY | Facility: HOSPITAL | Age: 74
Discharge: HOME | End: 2024-11-21
Admitting: INTERNAL MEDICINE
Payer: MEDICARE

## 2024-11-21 ENCOUNTER — OFFICE VISIT (OUTPATIENT)
Dept: CARDIOLOGY | Facility: CLINIC | Age: 74
End: 2024-11-21
Payer: MEDICARE

## 2024-11-21 VITALS
DIASTOLIC BLOOD PRESSURE: 74 MMHG | HEIGHT: 75 IN | HEART RATE: 71 BPM | BODY MASS INDEX: 36.06 KG/M2 | SYSTOLIC BLOOD PRESSURE: 132 MMHG | WEIGHT: 290 LBS

## 2024-11-21 DIAGNOSIS — N18.31 STAGE 3A CHRONIC KIDNEY DISEASE: ICD-10-CM

## 2024-11-21 DIAGNOSIS — I48.21 PERMANENT ATRIAL FIBRILLATION: ICD-10-CM

## 2024-11-21 DIAGNOSIS — I10 PRIMARY HYPERTENSION: ICD-10-CM

## 2024-11-21 DIAGNOSIS — E11.22 TYPE 2 DIABETES MELLITUS WITH STAGE 3A CHRONIC KIDNEY DISEASE, WITHOUT LONG-TERM CURRENT USE OF INSULIN: ICD-10-CM

## 2024-11-21 DIAGNOSIS — N18.31 TYPE 2 DIABETES MELLITUS WITH STAGE 3A CHRONIC KIDNEY DISEASE, WITHOUT LONG-TERM CURRENT USE OF INSULIN: ICD-10-CM

## 2024-11-21 DIAGNOSIS — I48.21 PERMANENT ATRIAL FIBRILLATION: Primary | ICD-10-CM

## 2024-11-21 LAB
ALBUMIN SERPL-MCNC: 3.3 G/DL (ref 3.5–5.2)
ALBUMIN/GLOB SERPL: 0.8 G/DL
ALP SERPL-CCNC: 80 U/L (ref 39–117)
ALT SERPL W P-5'-P-CCNC: 7 U/L (ref 1–41)
ANION GAP SERPL CALCULATED.3IONS-SCNC: 9.3 MMOL/L (ref 5–15)
AST SERPL-CCNC: 12 U/L (ref 1–40)
BASOPHILS # BLD AUTO: 0.03 10*3/MM3 (ref 0–0.2)
BASOPHILS NFR BLD AUTO: 0.4 % (ref 0–1.5)
BILIRUB SERPL-MCNC: 0.5 MG/DL (ref 0–1.2)
BUN SERPL-MCNC: 19 MG/DL (ref 8–23)
BUN/CREAT SERPL: 11.2 (ref 7–25)
CALCIUM SPEC-SCNC: 9.6 MG/DL (ref 8.6–10.5)
CHLORIDE SERPL-SCNC: 104 MMOL/L (ref 98–107)
CHOLEST SERPL-MCNC: 136 MG/DL (ref 0–200)
CO2 SERPL-SCNC: 25.7 MMOL/L (ref 22–29)
CREAT SERPL-MCNC: 1.7 MG/DL (ref 0.76–1.27)
DEPRECATED RDW RBC AUTO: 45.2 FL (ref 37–54)
EGFRCR SERPLBLD CKD-EPI 2021: 42 ML/MIN/1.73
EOSINOPHIL # BLD AUTO: 0.27 10*3/MM3 (ref 0–0.4)
EOSINOPHIL NFR BLD AUTO: 3.7 % (ref 0.3–6.2)
ERYTHROCYTE [DISTWIDTH] IN BLOOD BY AUTOMATED COUNT: 13.4 % (ref 12.3–15.4)
GLOBULIN UR ELPH-MCNC: 4.3 GM/DL
GLUCOSE SERPL-MCNC: 197 MG/DL (ref 65–99)
HBA1C MFR BLD: 8.1 % (ref 4.8–5.6)
HCT VFR BLD AUTO: 40.8 % (ref 37.5–51)
HDLC SERPL-MCNC: 37 MG/DL (ref 40–60)
HGB BLD-MCNC: 13.6 G/DL (ref 13–17.7)
IMM GRANULOCYTES # BLD AUTO: 0.08 10*3/MM3 (ref 0–0.05)
IMM GRANULOCYTES NFR BLD AUTO: 1.1 % (ref 0–0.5)
LDLC SERPL CALC-MCNC: 80 MG/DL (ref 0–100)
LDLC/HDLC SERPL: 2.14 {RATIO}
LYMPHOCYTES # BLD AUTO: 0.96 10*3/MM3 (ref 0.7–3.1)
LYMPHOCYTES NFR BLD AUTO: 13 % (ref 19.6–45.3)
MAGNESIUM SERPL-MCNC: 1.9 MG/DL (ref 1.6–2.4)
MCH RBC QN AUTO: 30.6 PG (ref 26.6–33)
MCHC RBC AUTO-ENTMCNC: 33.3 G/DL (ref 31.5–35.7)
MCV RBC AUTO: 91.9 FL (ref 79–97)
MONOCYTES # BLD AUTO: 0.48 10*3/MM3 (ref 0.1–0.9)
MONOCYTES NFR BLD AUTO: 6.5 % (ref 5–12)
NEUTROPHILS NFR BLD AUTO: 5.56 10*3/MM3 (ref 1.7–7)
NEUTROPHILS NFR BLD AUTO: 75.3 % (ref 42.7–76)
PLATELET # BLD AUTO: 130 10*3/MM3 (ref 140–450)
PMV BLD AUTO: 12.1 FL (ref 6–12)
POTASSIUM SERPL-SCNC: 4.5 MMOL/L (ref 3.5–5.2)
PROT SERPL-MCNC: 7.6 G/DL (ref 6–8.5)
RBC # BLD AUTO: 4.44 10*6/MM3 (ref 4.14–5.8)
SODIUM SERPL-SCNC: 139 MMOL/L (ref 136–145)
TRIGL SERPL-MCNC: 99 MG/DL (ref 0–150)
TSH SERPL DL<=0.05 MIU/L-ACNC: 2.26 UIU/ML (ref 0.27–4.2)
URATE SERPL-MCNC: 6.9 MG/DL (ref 3.4–7)
VLDLC SERPL-MCNC: 19 MG/DL (ref 5–40)
WBC NRBC COR # BLD AUTO: 7.38 10*3/MM3 (ref 3.4–10.8)

## 2024-11-21 PROCEDURE — 84443 ASSAY THYROID STIM HORMONE: CPT | Performed by: INTERNAL MEDICINE

## 2024-11-21 PROCEDURE — 85025 COMPLETE CBC W/AUTO DIFF WBC: CPT | Performed by: INTERNAL MEDICINE

## 2024-11-21 PROCEDURE — 80061 LIPID PANEL: CPT | Performed by: INTERNAL MEDICINE

## 2024-11-21 PROCEDURE — 36415 COLL VENOUS BLD VENIPUNCTURE: CPT

## 2024-11-21 PROCEDURE — 83036 HEMOGLOBIN GLYCOSYLATED A1C: CPT | Performed by: INTERNAL MEDICINE

## 2024-11-21 PROCEDURE — 84550 ASSAY OF BLOOD/URIC ACID: CPT | Performed by: INTERNAL MEDICINE

## 2024-11-21 PROCEDURE — 83735 ASSAY OF MAGNESIUM: CPT | Performed by: INTERNAL MEDICINE

## 2024-11-21 PROCEDURE — 80053 COMPREHEN METABOLIC PANEL: CPT | Performed by: INTERNAL MEDICINE

## 2024-11-21 NOTE — TELEPHONE ENCOUNTER
Patient lives in a facility, so I spoke to Lashawn there about results and recommendations. She verbalized understanding. I faxed the labs to his facility. I also faxed them to his PCP on file.     Ashleigh Nj RN  Triage [facility]

## 2024-11-21 NOTE — TELEPHONE ENCOUNTER
Triage nurses-please call, creatinine and hemoglobin A1c are elevated.  He needs to follow-up with his PCP. Fabiana-please fax his lab results to Dr. Bauman.

## 2024-11-21 NOTE — PROGRESS NOTES
Date of Office Visit: 2024  Encounter Provider: Marina Puentes MD  Place of Service: [facility] CARDIOLOGY  Patient Name: Efra Tam  :1950      Patient ID:  Efra Tam is a 74 y.o. male is here for  followup for atrial fibrillation        History of Present Illness    He has a history atrial fibrillation, stroke with right-sided weakness, coronary artery calcification, heart failure, GERD, hypertension, ANIBAL, peripheral arterial disease, gout.     He is , 3 children, is disabled, never smoked, uses no alcohol, has 1 caffeinated beverage per day and no drugs.     He was admitted  - 2023 with acute hypoxic respiratory failure due to bilateral pneumonia.  He was discharged to nursing care facility to finish his antibiotics and was weaned off of oxygen during hospitalization.Labs on 2023 showed creatinine 1.46, glucose 186 with otherwise normal BMP, hemoglobin 10.6, platelets 109, otherwise normal CBC.  Labs done 2023 show glucose 219 with creatinine 1.7, otherwise normal CMP, hemoglobin A1c 7.7%, normal TSH, total cholesterol 116, HDL 36, LDL 65, VLDL 15, triglycerides 74, hemoglobin 11, platelets 106, otherwise normal CBC.  Echo done 2023 shows ejection fraction 55% with mild concentric left ventricular hypertrophy, severe left atrial enlargement, normal diastolic function, mild aortic insufficiency.  CT chest abdomen pelvis done 2023 showed pulmonary groundglass opacities likely infectious or inflammatory, normal abdomen and pelvis, no pericardial effusion.  I did review the CT images showing scattered calcification in the aortic arch, scattered calcification of the aortic valve, proximal LAD calcification, left main calcification, RCA not well visualized.  CT head showed remote left PCA distribution infarct.     Venous duplex study done 2019 showed chronic right and left lower extremity deep venous thrombosis in the  gastrocnemius and soleal.    He denies chest pain or pressure.  His appetite is good.  He has no orthopnea or PND.  He has no lower extremity edema.  He has no sores on his skin.  He sleeps well.  He does not feels heart racing or skipping.  He has noticed no increase in short windedness, cough fevers or chills.  Generally, he has felt well.  Patient resides at [facility]    Past Medical History:   Diagnosis Date    ANIBAL (acute kidney injury)     Atrial fibrillation     Chronic systolic CHF (congestive heart failure)     Combined receptive and expressive 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     DM (diabetes mellitus)     Gout     Hemiparesis     R sided    Hyperlipidemia     Hypertension     PAD (peripheral artery disease)     Stroke          Past Surgical History:   Procedure Laterality Date    FRACTURE SURGERY         Current Outpatient Medications on File Prior to Visit   Medication Sig Dispense Refill    acetaminophen (TYLENOL) 325 MG tablet Take 2 tablets by mouth Every 4 (Four) Hours As Needed for Mild Pain.      allopurinol (ZYLOPRIM) 100 MG tablet Take 1 tablet by mouth Daily.      amLODIPine (NORVASC) 5 MG tablet Take 1 tablet by mouth Daily.      apixaban (ELIQUIS) 5 MG tablet tablet Take 1 tablet by mouth Every 12 (Twelve) Hours. 60 tablet     Artificial Tear Ointment (artificial tears) ophthalmic ointment Administer 1 Application to both eyes Every Night.      aspirin 81 MG EC tablet Take 1 tablet by mouth Daily.      atorvastatin (LIPITOR) 10 MG tablet Take 1 tablet by mouth Every Night.      carvedilol (COREG) 12.5 MG tablet Take 1 tablet by mouth 2 (Two) Times a Day With Meals for 30 days. 60 tablet 0    cetirizine (zyrTEC) 10 MG tablet Take 1 tablet by mouth Every Night.      famotidine (PEPCID) 20 MG tablet Take 1 tablet by mouth Daily.      metFORMIN (GLUCOPHAGE) 500 MG tablet Take 1 tablet by mouth Daily.      polyethylene glycol (MIRALAX) packet Take 17 g by mouth Daily As Needed.    "   VITAMIN D PO Take  by mouth.       No current facility-administered medications on file prior to visit.       Social History     Socioeconomic History    Marital status:     Number of children: 3   Tobacco Use    Smoking status: Never     Passive exposure: Never    Smokeless tobacco: Never    Tobacco comments:     Caffeine: 1 drink daily   Vaping Use    Vaping status: Never Used   Substance and Sexual Activity    Alcohol use: Yes     Alcohol/week: 6.0 - 8.0 standard drinks of alcohol     Types: 6 - 8 Cans of beer per week    Drug use: No    Sexual activity: Never             Procedures    ECG 12 Lead    Date/Time: 11/21/2024 1:29 PM  Performed by: Marina Puentes MD    Authorized by: Marina Puentes MD  Comparison: compared with previous ECG   Similar to previous ECG  Rhythm: atrial fibrillation    Clinical impression: abnormal EKG              Objective:      Vitals:    11/21/24 1306   BP: 132/74   Pulse: 71   Weight: 132 kg (290 lb)   Height: 190.5 cm (75\")     Body mass index is 36.25 kg/m².    Vitals reviewed.   Constitutional:       General: Not in acute distress.     Appearance: Not diaphoretic.   Neck:      Vascular: No carotid bruit or JVD.   Pulmonary:      Effort: Pulmonary effort is normal.      Breath sounds: Normal breath sounds.   Cardiovascular:      Normal rate. Irregularly irregular rhythm.      Murmurs: There is no murmur.      No gallop.  No rub.      Comments: Right hemiparesis  Pulses:     Intact distal pulses.      Carotid: 2+ bilaterally.     Radial: 2+ bilaterally.     Dorsalis pedis: 2+ bilaterally.     Posterior tibial: 2+ bilaterally.  Edema:     Peripheral edema absent.   Neurological:      Cranial Nerves: No cranial nerve deficit.         Lab Review:       Assessment:      Diagnosis Plan   1. Permanent atrial fibrillation  Comprehensive Metabolic Panel    CBC & Differential    Lipid Panel    Uric Acid    TSH    Magnesium      2. Primary hypertension  Comprehensive " Metabolic Panel    CBC & Differential    Lipid Panel    Uric Acid    TSH    Magnesium      3. Type 2 diabetes mellitus with stage 3a chronic kidney disease, without long-term current use of insulin  Comprehensive Metabolic Panel    CBC & Differential    Lipid Panel    Uric Acid    TSH    Magnesium    Hemoglobin A1c      4. Stage 3a chronic kidney disease  Comprehensive Metabolic Panel    CBC & Differential    Lipid Panel    Uric Acid    TSH    Magnesium        Atrial fibrillation, persistent.  Remain on Eliquis and carvedilol.  He is well rate controlled.  His [id number] score is 5 giving a 6.7% annual risk of stroke.  Hypertension, goal <120/80.  Controlled.  CKD repeat labs today  Diabetes mellitus type 2, on metformin  History of stroke with right hemiparesis  Pneumonia with respiratory failure in 2023.  Peripheral arterial disease with history of bilateral lower extremity venous thrombi.     Plan:       No medication changes, see Lela in 1 year, set up laboratory tests.  Overall stable.

## 2024-12-18 ENCOUNTER — TRANSCRIBE ORDERS (OUTPATIENT)
Dept: ADMINISTRATIVE | Facility: HOSPITAL | Age: 74
End: 2024-12-18
Payer: COMMERCIAL

## 2024-12-18 DIAGNOSIS — I73.9 PERIPHERAL VASCULAR DISEASE, UNSPECIFIED: Primary | ICD-10-CM

## 2025-01-17 ENCOUNTER — HOSPITAL ENCOUNTER (OUTPATIENT)
Dept: CT IMAGING | Facility: HOSPITAL | Age: 75
Discharge: HOME | End: 2025-01-17
Payer: MEDICARE

## 2025-01-17 DIAGNOSIS — I73.9 PERIPHERAL VASCULAR DISEASE, UNSPECIFIED: ICD-10-CM

## 2025-01-17 LAB — CREAT BLDA-MCNC: 1.8 MG/DL (ref 0.6–1.3)

## 2025-01-17 PROCEDURE — 82565 ASSAY OF CREATININE: CPT

## 2025-01-17 PROCEDURE — 25510000001 IOPAMIDOL PER 1 ML: Performed by: INTERNAL MEDICINE

## 2025-01-17 PROCEDURE — 74174 CTA ABD&PLVS W/CONTRAST: CPT

## 2025-01-17 RX ORDER — IOPAMIDOL 755 MG/ML
100 INJECTION, SOLUTION INTRAVASCULAR
Status: COMPLETED | OUTPATIENT
Start: 2025-01-17 | End: 2025-01-17

## 2025-01-17 RX ADMIN — IOPAMIDOL 95 ML: 755 INJECTION, SOLUTION INTRAVENOUS at 15:50
